# Patient Record
Sex: MALE | Race: WHITE | NOT HISPANIC OR LATINO | Employment: FULL TIME | URBAN - METROPOLITAN AREA
[De-identification: names, ages, dates, MRNs, and addresses within clinical notes are randomized per-mention and may not be internally consistent; named-entity substitution may affect disease eponyms.]

---

## 2020-01-14 ENCOUNTER — OFFICE VISIT (OUTPATIENT)
Dept: FAMILY MEDICINE CLINIC | Facility: CLINIC | Age: 35
End: 2020-01-14
Payer: COMMERCIAL

## 2020-01-14 VITALS
HEART RATE: 96 BPM | TEMPERATURE: 98.2 F | HEIGHT: 68 IN | BODY MASS INDEX: 34.56 KG/M2 | SYSTOLIC BLOOD PRESSURE: 140 MMHG | WEIGHT: 228 LBS | OXYGEN SATURATION: 98 % | DIASTOLIC BLOOD PRESSURE: 90 MMHG

## 2020-01-14 DIAGNOSIS — Z13.29 SCREENING FOR THYROID DISORDER: ICD-10-CM

## 2020-01-14 DIAGNOSIS — R03.0 ELEVATED BP WITHOUT DIAGNOSIS OF HYPERTENSION: ICD-10-CM

## 2020-01-14 DIAGNOSIS — Z72.0 TOBACCO ABUSE: ICD-10-CM

## 2020-01-14 DIAGNOSIS — Z13.6 ENCOUNTER FOR LIPID SCREENING FOR CARDIOVASCULAR DISEASE: ICD-10-CM

## 2020-01-14 DIAGNOSIS — Z13.1 ENCOUNTER FOR SCREENING FOR DIABETES MELLITUS: ICD-10-CM

## 2020-01-14 DIAGNOSIS — F90.9 ATTENTION DEFICIT HYPERACTIVITY DISORDER (ADHD), UNSPECIFIED ADHD TYPE: ICD-10-CM

## 2020-01-14 DIAGNOSIS — Z13.0 SCREENING FOR IRON DEFICIENCY ANEMIA: ICD-10-CM

## 2020-01-14 DIAGNOSIS — Z00.01 ENCOUNTER FOR WELL ADULT EXAM WITH ABNORMAL FINDINGS: Primary | ICD-10-CM

## 2020-01-14 DIAGNOSIS — Z13.220 ENCOUNTER FOR LIPID SCREENING FOR CARDIOVASCULAR DISEASE: ICD-10-CM

## 2020-01-14 DIAGNOSIS — Z11.4 SCREENING FOR HIV (HUMAN IMMUNODEFICIENCY VIRUS): ICD-10-CM

## 2020-01-14 PROBLEM — F90.2 ATTENTION DEFICIT HYPERACTIVITY DISORDER (ADHD), COMBINED TYPE: Status: ACTIVE | Noted: 2020-01-14

## 2020-01-14 PROCEDURE — 99385 PREV VISIT NEW AGE 18-39: CPT | Performed by: FAMILY MEDICINE

## 2020-01-14 RX ORDER — ESCITALOPRAM OXALATE 10 MG/1
TABLET ORAL
COMMUNITY
Start: 2019-12-31

## 2020-01-14 RX ORDER — CLONAZEPAM 0.5 MG/1
TABLET ORAL
COMMUNITY
Start: 2020-01-09

## 2020-01-14 RX ORDER — NICOTINE 21 MG/24HR
1 PATCH, TRANSDERMAL 24 HOURS TRANSDERMAL EVERY 24 HOURS
Qty: 28 PATCH | Refills: 0 | Status: SHIPPED | OUTPATIENT
Start: 2020-01-14 | End: 2020-02-14 | Stop reason: SDUPTHER

## 2020-01-14 RX ORDER — DEXTROAMPHETAMINE SACCHARATE, AMPHETAMINE ASPARTATE, DEXTROAMPHETAMINE SULFATE AND AMPHETAMINE SULFATE 5; 5; 5; 5 MG/1; MG/1; MG/1; MG/1
TABLET ORAL
COMMUNITY
Start: 2019-12-09

## 2020-01-14 NOTE — ASSESSMENT & PLAN NOTE
The BMI is above average  BMI counseling and education was provided to the patient  Nutrition recommendations include reducing portion sizes, decreasing overall calorie intake, 3-5 servings of fruits/vegetables daily, reducing fast food intake, consuming healthier snacks, decreasing soda and/or juice intake, moderation in carbohydrate intake and reducing intake of saturated fat and trans fat  Exercise recommendations include moderate aerobic physical activity for 150 minutes/week, exercising 3-5 times per week and joining a gym    The plan for blood work to rule out diabetic hyperlipidemia and thyroid problem discussed with the patient

## 2020-01-14 NOTE — ASSESSMENT & PLAN NOTE
Tobacco Use/Cessation  i     I advised patient to quit, and offered support  Discussed current use pattern  Asked patient to inform me when they set a quit date     At discussed with the patient complication of for smoking increase the risk of multiple kind of cancer he is aware  We offer him script for nicotine patch patient does smoke 5 cigarettes a day plan to start him with a nicotine patch 14 mg 1 patch a day for 4 weeks then 7 mg 1 patch a day for 2 weeks proper use of medication possible side effect discussed with the patient

## 2020-01-14 NOTE — ASSESSMENT & PLAN NOTE
Advice and education were given regarding nutrition, aerobic exercises, weight bearing exercises, cardiovascular risk reduction, fall risk reduction, and age appropriate supplements  The patient was counseled regarding instructions for management, risk factor reductions, prognosis, risks and benefits of treatment options, patient and family education, and importance of compliance with treatment       Patient decline immunization for today

## 2020-01-14 NOTE — ASSESSMENT & PLAN NOTE
The chronic stable on current medication patient the used to see by psychiatric high would like to be seen different 1 will refer him to psychiatric today

## 2020-01-14 NOTE — PROGRESS NOTES
BMI Counseling: Body mass index is 34 92 kg/m²  The BMI is above normal  Nutrition recommendations include decreasing portion sizes, encouraging healthy choices of fruits and vegetables and consuming healthier snacks  Exercise recommendations include moderate physical activity 150 minutes/week  Tobacco Cessation Counseling: Tobacco cessation counseling was provided  The patient is sincerely urged to quit consumption of tobacco  He is ready to quit tobacco  Medication options discussed  Patient agreed to medication  Nicotine patch was prescribed  FAMILY PRACTICE HEALTH MAINTENANCE OFFICE VISIT  [facility] [place]    NAME: Narciso Fields  AGE: 37 y o  SEX: male  : 1985     DATE: 2020    Assessment and Plan     1  Encounter for well adult exam with abnormal findings  Assessment & Plan:  Advice and education were given regarding nutrition, aerobic exercises, weight bearing exercises, cardiovascular risk reduction, fall risk reduction, and age appropriate supplements  The patient was counseled regarding instructions for management, risk factor reductions, prognosis, risks and benefits of treatment options, patient and family education, and importance of compliance with treatment  Patient decline immunization for today      2  BMI 34 0-34 9,adult  Assessment & Plan:  The BMI is above average  BMI counseling and education was provided to the patient  Nutrition recommendations include reducing portion sizes, decreasing overall calorie intake, 3-5 servings of fruits/vegetables daily, reducing fast food intake, consuming healthier snacks, decreasing soda and/or juice intake, moderation in carbohydrate intake and reducing intake of saturated fat and trans fat  Exercise recommendations include moderate aerobic physical activity for 150 minutes/week, exercising 3-5 times per week and joining a gym    The plan for blood work to rule out diabetic hyperlipidemia and thyroid problem discussed with the patient      3  Tobacco abuse  Assessment & Plan:  Tobacco Use/Cessation  i     I advised patient to quit, and offered support  Discussed current use pattern  Asked patient to inform me when they set a quit date     At discussed with the patient complication of for smoking increase the risk of multiple kind of cancer he is aware  We offer him script for nicotine patch patient does smoke 5 cigarettes a day plan to start him with a nicotine patch 14 mg 1 patch a day for 4 weeks then 7 mg 1 patch a day for 2 weeks proper use of medication possible side effect discussed with the patient      Orders:  -     nicotine (NICODERM CQ) 14 mg/24hr TD 24 hr patch; Place 1 patch on the skin every 24 hours    4  Elevated BP without diagnosis of hypertension  Assessment & Plan:  The discussed important lose weight low-salt diet will recheck blood pressure persistent to be high will start medication      5  Attention deficit hyperactivity disorder (ADHD), unspecified ADHD type  Assessment & Plan:  The chronic stable on current medication patient the used to see by psychiatric high would like to be seen different 1 will refer him to psychiatric today    Orders:  -     Ambulatory referral to Psychiatry; Future    6  Screening for HIV (human immunodeficiency virus)  -     [facility] HIV 1/2 AG-AB combo; Future    7  Screening for thyroid disorder  -     CBC and differential; Future  -     Basic metabolic panel; Future  -     Lipid Panel with Direct LDL reflex; Future  -     TSH, 3rd generation with Free T4 reflex; Future    8  Screening for iron deficiency anemia  -     CBC and differential; Future  -     Basic metabolic panel; Future  -     Lipid Panel with Direct LDL reflex; Future  -     TSH, 3rd generation with Free T4 reflex; Future    9  Encounter for screening for diabetes mellitus  -     CBC and differential; Future  -     Basic metabolic panel;  Future  -     Lipid Panel with Direct LDL reflex; Future  -     TSH, 3rd generation with Free T4 reflex; Future    10  Encounter for lipid screening for cardiovascular disease  -     CBC and differential; Future  -     Basic metabolic panel; Future  -     Lipid Panel with Direct LDL reflex; Future  -     TSH, 3rd generation with Free T4 reflex; Future      · Patient Counseling:   · Nutrition: Stressed importance of a well balanced diet, moderation of sodium/saturated fat, caloric balance and sufficient intake of fiber  · Exercise: Stressed the importance of regular exercise with a goal of 150 minutes per week  · Dental Health: Discussed daily flossing and brushing and regular dental visits     · Immunizations reviewed: Declined recommended vaccinations  · Discussed benefits of:  Screening labs   BMI Counseling: Body mass index is 34 92 kg/m²  Discussed with patient's BMI with him         Chief Complaint     Chief Complaint   Patient presents with    Physical Exam       History of Present Illness     Patient here for establish care an new in my office patient deny any chest pain short of breath no palpitation no cough no wheezing no hematosis no headache no blurred vision no weakness or lateralized of the symptom no abdomen pain nausea vomiting or diarrhea no renal problem no rash no fever no change in the weight and no change in the mood   He does do regular diet does not do exercise regularly patient does smoke and he had been smoking for more than 15 years and he has to be smoke half pack a day the but since he has his baby which is 1 year ago he cut down to 5 cigarettes a day  Patient known to have history of ADHD follow-up by psychiatric who manages medication      Well Adult Physical   Patient here for a comprehensive physical exam       Diet and Physical Activity  Diet: Regular diet  Exercise: rarely      Depression Screen  PHQ-9 Depression Screening    PHQ-9:    Frequency of the following problems over the past two weeks: Little interest or pleasure in doing things:  0 - not at all  Feeling down, depressed, or hopeless:  0 - not at all  PHQ-2 Score:  0          General Health  Hearing: Normal:  bilateral  Vision: no vision problems  Dental: brushes teeth twice daily    The following portions of the patient's history were reviewed and updated as appropriate: allergies, current medications, past family history, past medical history, past social history, past surgical history and problem list     Review of Systems     Review of Systems   Constitutional: Negative for fatigue and fever  HENT: Negative for ear pain, sinus pressure, sinus pain and sore throat  Eyes: Negative for pain and redness  Respiratory: Negative for cough, chest tightness and shortness of breath  Cardiovascular: Negative for chest pain, palpitations and leg swelling  Gastrointestinal: Negative for abdominal pain, blood in stool, constipation, diarrhea and nausea  Endocrine: Negative for heat intolerance and polydipsia  Genitourinary: Negative for flank pain, frequency and hematuria  Musculoskeletal: Negative for back pain and joint swelling  Skin: Negative for rash  Neurological: Negative for dizziness, numbness and headaches  Hematological: Does not bruise/bleed easily  Psychiatric/Behavioral: Negative for agitation and behavioral problems  Past Medical History     Past Medical History:   Diagnosis Date    ADD (attention deficit disorder)     Anxiety     Pilonidal cyst 2002       Past Surgical History     History reviewed  No pertinent surgical history      Social History     Social History     Socioeconomic History    Marital status: Single     Spouse name: None    Number of children: None    Years of education: None    Highest education level: None   Occupational History    None   Social Needs    Financial resource strain: Not hard at all   [employer] insecurity:     Worry: Never true     Inability: Never true   [employer] Transportation needs:     Medical: No     Non-medical: No   Tobacco Use    Smoking status: Current Every Day Smoker    Smokeless tobacco: Current User   Substance and Sexual Activity    Alcohol use: Yes    Drug use: Never    Sexual activity: Yes     Partners: Female   Lifestyle    Physical activity:     Days per week: 0 days     Minutes per session: 0 min    Stress: Not at all   Relationships    Social connections:     Talks on phone: More than three times a week     Gets together: Never     Attends Rastafari service: Never     Active member of club or organization: No     Attends meetings of clubs or organizations: Never     Relationship status: Living with partner    Intimate partner violence:     Fear of current or ex partner: No     Emotionally abused: No     Physically abused: No     Forced sexual activity: No   Other Topics Concern    None   Social History Narrative    Not a fall risk    Moderate Activity Level    Changes in Sleep    Has Animals    No Firearms    Uses Safety Belt        Family History     History reviewed  No pertinent family history  Current Medications       Current Outpatient Medications:     amphetamine-dextroamphetamine (ADDERALL) 20 mg tablet, TK 1 T PO BID, Disp: , Rfl:     clonazePAM (KlonoPIN) 0 5 mg tablet, TK 1 T PO TID, Disp: , Rfl:     escitalopram (LEXAPRO) 10 mg tablet, TK 1 T PO D, Disp: , Rfl:     nicotine (NICODERM CQ) 14 mg/24hr TD 24 hr patch, Place 1 patch on the skin every 24 hours, Disp: 28 patch, Rfl: 0     Allergies     Allergies   Allergen Reactions    Penicillins Hives       Objective     /90   Pulse 96   Temp 98 2 °F (36 8 °C) (Tympanic)   Ht 5' 7 75" (1 721 m)   Wt 103 kg (228 lb)   SpO2 98%   BMI 34 92 kg/m²      Physical Exam   Constitutional: He is oriented to person, place, and time  He appears well-developed and well-nourished  HENT:   Head: Normocephalic     Right Ear: External ear normal    Left Ear: External ear normal  Eyes: Conjunctivae and EOM are normal  Right eye exhibits no discharge  Left eye exhibits no discharge  Neck: No JVD present  Cardiovascular: Normal rate, regular rhythm and normal heart sounds  Exam reveals no gallop  No murmur heard  Pulmonary/Chest: Effort normal  No respiratory distress  He has no wheezes  He has no rales  He exhibits no tenderness  Abdominal: He exhibits no mass  There is no tenderness  There is no rebound  Musculoskeletal: He exhibits no edema or tenderness  Neurological: He is alert and oriented to person, place, and time  Skin: No rash noted  No erythema  Psychiatric: He has a normal mood and affect  Clif Garcia MD  [place] [facility]  BMI Counseling: Body mass index is 34 92 kg/m²  The BMI is above normal  Nutrition recommendations include reducing portion sizes, decreasing overall calorie intake, 3-5 servings of fruits/vegetables daily and reducing fast food intake  Exercise recommendations include moderate aerobic physical activity for 150 minutes/week

## 2020-01-14 NOTE — ASSESSMENT & PLAN NOTE
The discussed important lose weight low-salt diet will recheck blood pressure persistent to be high will start medication

## 2020-01-14 NOTE — PATIENT INSTRUCTIONS

## 2020-02-14 ENCOUNTER — OFFICE VISIT (OUTPATIENT)
Dept: FAMILY MEDICINE CLINIC | Facility: CLINIC | Age: 35
End: 2020-02-14
Payer: COMMERCIAL

## 2020-02-14 VITALS
OXYGEN SATURATION: 94 % | BODY MASS INDEX: 35.16 KG/M2 | HEART RATE: 87 BPM | WEIGHT: 224 LBS | TEMPERATURE: 99.1 F | SYSTOLIC BLOOD PRESSURE: 130 MMHG | DIASTOLIC BLOOD PRESSURE: 90 MMHG | HEIGHT: 67 IN

## 2020-02-14 DIAGNOSIS — I10 ESSENTIAL HYPERTENSION: ICD-10-CM

## 2020-02-14 DIAGNOSIS — R06.2 WHEEZING: Primary | ICD-10-CM

## 2020-02-14 PROCEDURE — 3008F BODY MASS INDEX DOCD: CPT | Performed by: FAMILY MEDICINE

## 2020-02-14 PROCEDURE — 4004F PT TOBACCO SCREEN RCVD TLK: CPT | Performed by: FAMILY MEDICINE

## 2020-02-14 PROCEDURE — 3080F DIAST BP >= 90 MM HG: CPT | Performed by: FAMILY MEDICINE

## 2020-02-14 PROCEDURE — 99214 OFFICE O/P EST MOD 30 MIN: CPT | Performed by: FAMILY MEDICINE

## 2020-02-14 PROCEDURE — 3075F SYST BP GE 130 - 139MM HG: CPT | Performed by: FAMILY MEDICINE

## 2020-02-14 RX ORDER — ALBUTEROL SULFATE 90 UG/1
2 AEROSOL, METERED RESPIRATORY (INHALATION) EVERY 6 HOURS PRN
Qty: 1 INHALER | Refills: 5 | Status: SHIPPED | OUTPATIENT
Start: 2020-02-14

## 2020-02-14 RX ORDER — TADALAFIL 10 MG/1
TABLET ORAL
COMMUNITY
Start: 2020-01-17

## 2020-02-14 NOTE — PROGRESS NOTES
Subjective:   Chief Complaint   Patient presents with    Follow-up     chronic conditions        Patient ID: Radha Rodriguez is a 37 y o  male  Patient here for follow-up previously blood pressure was 140/90 and the we evaluated today blood pressure 130/90 he deny any chest pain short of breath no palpitation no dyspnea on exertion no lower extremity edema patient did lose 4 lb from previously he did not do the blood work yet patient does smoke      The following portions of the patient's history were reviewed and updated as appropriate: allergies, current medications, past family history, past medical history, past social history, past surgical history and problem list     Review of Systems   Constitutional: Negative for fatigue and fever  HENT: Negative for ear pain, sinus pressure, sinus pain and sore throat  Eyes: Negative for pain and redness  Respiratory: Negative for cough, chest tightness and shortness of breath  Cardiovascular: Negative for chest pain, palpitations and leg swelling  Gastrointestinal: Negative for abdominal pain, blood in stool, constipation, diarrhea and nausea  Genitourinary: Negative for flank pain, frequency and hematuria  Musculoskeletal: Negative for back pain and joint swelling  Skin: Negative for rash  Neurological: Negative for dizziness, numbness and headaches  Hematological: Does not bruise/bleed easily  Objective:  Vitals:    02/14/20 1049   BP: 130/90   Pulse: 87   Temp: 99 1 °F (37 3 °C)   TempSrc: Tympanic   SpO2: 94%   Weight: 102 kg (224 lb)   Height: 5' 7" (1 702 m)      Physical Exam   Constitutional: He is oriented to person, place, and time  He appears well-developed and well-nourished  HENT:   Head: Normocephalic  Right Ear: External ear normal    Left Ear: External ear normal    Eyes: Conjunctivae and EOM are normal  Right eye exhibits no discharge  Left eye exhibits no discharge  Neck: No JVD present     Cardiovascular: Normal rate, regular rhythm and normal heart sounds  Exam reveals no gallop  No murmur heard  Pulmonary/Chest: Effort normal  No respiratory distress  He has wheezes  He has no rales  He exhibits no tenderness  Wheezing in bilateral lung worse in the left side   Abdominal: He exhibits no mass  There is no tenderness  There is no rebound  Musculoskeletal: He exhibits no edema or tenderness  Neurological: He is alert and oriented to person, place, and time  Skin: No rash noted  No erythema  Assessment/Plan:    Essential hypertension  New diagnosis asymptomatic discussed the patient important lose weight low-salt diet blood work order previously was not done yet include TSH BMP and lipid    Wheezing  A new finding on exam today and patient does have history of smoking we give a Ventolin inhaler proper use discussed with the patient we order chest x-ray       Diagnoses and all orders for this visit:    Wheezing  -     albuterol (PROVENTIL HFA,VENTOLIN HFA) 90 mcg/act inhaler;  Inhale 2 puffs every 6 (six) hours as needed for wheezing or shortness of breath  -     XR chest pa & lateral; Future    Essential hypertension    Other orders  -     tadalafil (CIALIS) 10 MG tablet; TK 1 T PO QD PRN

## 2020-02-14 NOTE — ASSESSMENT & PLAN NOTE
A new finding on exam today and patient does have history of smoking we give a Ventolin inhaler proper use discussed with the patient we order chest x-ray

## 2020-02-14 NOTE — ASSESSMENT & PLAN NOTE
New diagnosis asymptomatic discussed the patient important lose weight low-salt diet blood work order previously was not done yet include TSH BMP and lipid

## 2020-08-28 ENCOUNTER — HOSPITAL ENCOUNTER (EMERGENCY)
Facility: HOSPITAL | Age: 35
Discharge: HOME | End: 2020-08-28
Attending: EMERGENCY MEDICINE
Payer: COMMERCIAL

## 2020-08-28 VITALS
WEIGHT: 225 LBS | HEART RATE: 100 BPM | OXYGEN SATURATION: 96 % | BODY MASS INDEX: 35.24 KG/M2 | TEMPERATURE: 97.6 F | DIASTOLIC BLOOD PRESSURE: 84 MMHG | RESPIRATION RATE: 18 BRPM | SYSTOLIC BLOOD PRESSURE: 143 MMHG

## 2020-08-28 DIAGNOSIS — R19.7 DIARRHEA: ICD-10-CM

## 2020-08-28 DIAGNOSIS — K62.89 RECTAL PAIN: Primary | ICD-10-CM

## 2020-08-28 PROCEDURE — 99283 EMERGENCY DEPT VISIT LOW MDM: CPT

## 2020-08-28 PROCEDURE — 99282 EMERGENCY DEPT VISIT SF MDM: CPT | Performed by: EMERGENCY MEDICINE

## 2020-08-28 RX ORDER — LIDOCAINE HYDROCHLORIDE 20 MG/ML
1 JELLY TOPICAL ONCE
Status: COMPLETED | OUTPATIENT
Start: 2020-08-28 | End: 2020-08-28

## 2020-08-28 RX ORDER — HYDROCORTISONE 25 MG/G
CREAM TOPICAL 2 TIMES DAILY
Qty: 1 TUBE | Refills: 0 | Status: SHIPPED | OUTPATIENT
Start: 2020-08-28

## 2020-08-28 RX ORDER — HYDROCORTISONE ACETATE 25 MG/1
25 SUPPOSITORY RECTAL 2 TIMES DAILY PRN
Qty: 12 SUPPOSITORY | Refills: 0 | Status: SHIPPED | OUTPATIENT
Start: 2020-08-28

## 2020-08-28 RX ADMIN — LIDOCAINE HYDROCHLORIDE 1 APPLICATION: 20 JELLY TOPICAL at 06:28

## 2020-08-28 NOTE — ED PROVIDER NOTES
History  Chief Complaint   Patient presents with    Rectal Pain     Has had diarrhea since Tuesday/Wednesday and now has rectal pain  Increases with bowel movement and sitting upright  59-year-old male presents for rectal pain  Patient states he has been having diarrhea since earlier this week, diarrhea is nonbloody and brown  Patient reports starting around yesterday he began to experience rectal pain while having a bowel movement and also while not  Patient finds sitting upright difficult  Patient does admit to straining while having bowel movements, he does not feel as if he has hemorrhoid  Patient denies blood in his underwear or on the toilet paper  The patient has been taking Imodium and Pepto-Bismol with only mild relief  Patient has not recently been on antibiotics, denies recent travel or new foods to him  Patient reports a history of this several years ago and notes a suppository helped  Patient reports history of a GI disturbance in his mother, he has never been evaluated for this, denies family history of colon cancer  Patient denies alcohol use, admits to vaping  He denies fevers or chills, abdominal pain  Patient had 1 episode of vomiting yesterday  He has been able to tolerate p o  and has had an adequate appetite  Prior to Admission Medications   Prescriptions Last Dose Informant Patient Reported? Taking?    albuterol (PROVENTIL HFA,VENTOLIN HFA) 90 mcg/act inhaler   No No   Sig: Inhale 2 puffs every 6 (six) hours as needed for wheezing or shortness of breath   amphetamine-dextroamphetamine (ADDERALL) 20 mg tablet  Self Yes No   Sig: TK 1 T PO BID   clonazePAM (KlonoPIN) 0 5 mg tablet  Self Yes No   Sig: TK 1 T PO TID   escitalopram (LEXAPRO) 10 mg tablet  Self Yes No   Sig: TK 1 T PO D   tadalafil (CIALIS) 10 MG tablet  Self Yes No   Sig: TK 1 T PO QD PRN      Facility-Administered Medications: None       Past Medical History:   Diagnosis Date    ADD (attention deficit disorder)     Anxiety     Essential hypertension 2/14/2020    Pilonidal cyst 2002       History reviewed  No pertinent surgical history  History reviewed  No pertinent family history  I have reviewed and agree with the history as documented  E-Cigarette/Vaping    E-Cigarette Use Current Every Day User      E-Cigarette/Vaping Substances    Nicotine Yes      Social History     Tobacco Use    Smoking status: Former Smoker    Smokeless tobacco: Current User   Substance Use Topics    Alcohol use: Yes    Drug use: Never        Review of Systems   Constitutional: Negative for activity change, appetite change, chills and fever  Respiratory: Negative for shortness of breath  Cardiovascular: Negative for chest pain  Gastrointestinal: Positive for diarrhea  Negative for abdominal pain, blood in stool, constipation, nausea and vomiting  Genitourinary: Negative for dysuria  Neurological: Negative for syncope, light-headedness and headaches  All other systems reviewed and are negative  Physical Exam  ED Triage Vitals   Temperature Pulse Respirations Blood Pressure SpO2   08/28/20 0555 08/28/20 0550 08/28/20 0550 08/28/20 0550 08/28/20 0550   97 6 °F (36 4 °C) 100 18 143/84 96 %      Temp Source Heart Rate Source Patient Position - Orthostatic VS BP Location FiO2 (%)   08/28/20 0555 -- -- -- --   Oral          Pain Score       08/28/20 0550       7             Orthostatic Vital Signs  Vitals:    08/28/20 0550   BP: 143/84   Pulse: 100       Physical Exam  Vitals signs reviewed  Exam conducted with a chaperone present  Constitutional:       General: He is not in acute distress  Appearance: Normal appearance  He is obese  He is not ill-appearing, toxic-appearing or diaphoretic  HENT:      Head: Normocephalic and atraumatic  Eyes:      General:         Right eye: No discharge  Left eye: No discharge  Cardiovascular:      Rate and Rhythm: Normal rate and regular rhythm  Pulmonary:      Effort: Pulmonary effort is normal  No respiratory distress  Breath sounds: Normal breath sounds  Abdominal:      General: There is no distension  Palpations: Abdomen is soft  Tenderness: There is no abdominal tenderness  There is no guarding or rebound  Genitourinary:     Rectum: Tenderness present  No anal fissure or external hemorrhoid  Comments: Pain when spreading buttocks, no erythema or skin color changes, no fissures/fistulas, no hemorrhoids  Musculoskeletal:         General: No deformity or signs of injury  Skin:     General: Skin is warm  Coloration: Skin is not pale  Findings: No erythema  Neurological:      General: No focal deficit present  Mental Status: He is alert  Coordination: Coordination normal          ED Medications  Medications   lidocaine (URO-JET, GLYDO) 2 % urethral/mucosal gel 1 application (1 application Topical Given 8/28/20 1091)       Diagnostic Studies  Results Reviewed     None                 No orders to display         Procedures  Procedures      ED Course                                           MDM  Number of Diagnoses or Management Options  Diarrhea:   Rectal pain:   Diagnosis management comments: 44yo male presents for rectal pain  He has been experiencing diarrhea for the last several days  On exam, patient does not have any skin discolorations, hemorrhoids, evidence of fissures or fistulas  Sakina Cheadle was applied with mild relief in symptoms  Pain likely irritation from diarrhea, he was given prescription for anusol cream and suppositories as this previously helped him  He is advised to follow-up with is PCP for his symptoms today and to possibly arrange for colonoscopy           Disposition  Final diagnoses:   Rectal pain   Diarrhea     Time reflects when diagnosis was documented in both MDM as applicable and the Disposition within this note     Time User Action Codes Description Comment    8/28/2020  7:15 AM Drew Malik Add [Q72 31] Rectal pain     8/28/2020  7:15 AM Drew Malik Add [R19 7] Diarrhea       ED Disposition     ED Disposition Condition Date/Time Comment    Discharge Stable Fri Aug 28, 2020  7:15 AM Alexandra Chu discharge to home/self care  Follow-up Information     Follow up With Specialties Details Why Contact Info Additional Information    Priscilla Bullard MD [facility] Medicine Schedule an appointment as soon as possible for a visit  Follow-up with your PCP regarding your diarrhea and rectal pain  [address] [facility] Emergency Department Emergency Medicine  If symptoms worsen [address] [id number]  [phone]  ED, [address] [place] [address], [address] [phone]          Discharge Medication List as of 8/28/2020  7:16 AM      START taking these medications    Details   hydrocortisone (ANUSOL-HC) 2 5 % rectal cream Apply topically 2 (two) times a day, Starting Fri 8/28/2020, Normal      hydrocortisone (ANUSOL-HC) 25 mg suppository Insert 1 suppository (25 mg total) into the rectum 2 (two) times a day as needed for hemorrhoids, Starting Fri 8/28/2020, Print         CONTINUE these medications which have NOT CHANGED    Details   albuterol (PROVENTIL HFA,VENTOLIN HFA) 90 mcg/act inhaler Inhale 2 puffs every 6 (six) hours as needed for wheezing or shortness of breath, Starting Fri 2/14/2020, Normal      amphetamine-dextroamphetamine (ADDERALL) 20 mg tablet TK 1 T PO BID, Historical Med      clonazePAM (KlonoPIN) 0 5 mg tablet TK 1 T PO TID, Historical Med      escitalopram (LEXAPRO) 10 mg tablet TK 1 T PO D, Historical Med      tadalafil (CIALIS) 10 MG tablet TK 1 T PO QD PRN, Historical Med           No discharge procedures on file  PDMP Review     None           ED Provider  Attending physically available and evaluated Lorenzaverna Kimberley HANSEN managed the patient along with the ED Attending      Electronically Signed by         Teresia Merlin, DO  08/28/20 5443

## 2020-08-31 NOTE — ED ATTENDING ATTESTATION
8/28/2020  ITimo See, DO, saw and evaluated the patient  I have discussed the patient with the resident/non-physician practitioner and agree with the resident's/non-physician practitioner's findings, Plan of Care, and MDM as documented in the resident's/non-physician practitioner's note, except where noted  All available labs and Radiology studies were reviewed  I was present for key portions of any procedure(s) performed by the resident/non-physician practitioner and I was immediately available to provide assistance  At this point I agree with the current assessment done in the Emergency Department  I have conducted an independent evaluation of this patient a history and physical is as follows:    20-year-old male presents for rectal pain  Burning sensation  Multiple episodes of diarrhea  Hurts to wipe  No blood  Similar episode years ago that resolved on its own  Denies anal receptive sex  Denies fevers or chills  Denies any other associated symptoms  Reports last time Anusol helped    Will prescribe Anusol discharge follow-up with Colorectal needs colonoscopy likely    ED Course         Critical Care Time  Procedures

## 2021-04-26 ENCOUNTER — TELEPHONE (OUTPATIENT)
Dept: FAMILY MEDICINE CLINIC | Facility: CLINIC | Age: 36
End: 2021-04-26

## 2021-05-04 ENCOUNTER — OFFICE VISIT (OUTPATIENT)
Dept: URGENT CARE | Age: 36
End: 2021-05-04
Payer: COMMERCIAL

## 2021-05-04 ENCOUNTER — APPOINTMENT (OUTPATIENT)
Dept: RADIOLOGY | Age: 36
End: 2021-05-04
Payer: COMMERCIAL

## 2021-05-04 VITALS
DIASTOLIC BLOOD PRESSURE: 67 MMHG | TEMPERATURE: 98 F | SYSTOLIC BLOOD PRESSURE: 145 MMHG | OXYGEN SATURATION: 95 % | RESPIRATION RATE: 18 BRPM | HEART RATE: 90 BPM

## 2021-05-04 DIAGNOSIS — S99.922A INJURY OF LEFT FOOT, INITIAL ENCOUNTER: Primary | ICD-10-CM

## 2021-05-04 DIAGNOSIS — S99.922A INJURY OF LEFT FOOT, INITIAL ENCOUNTER: ICD-10-CM

## 2021-05-04 PROCEDURE — 99213 OFFICE O/P EST LOW 20 MIN: CPT | Performed by: NURSE PRACTITIONER

## 2021-05-04 PROCEDURE — 73610 X-RAY EXAM OF ANKLE: CPT

## 2021-05-04 PROCEDURE — 73630 X-RAY EXAM OF FOOT: CPT

## 2021-05-04 RX ORDER — KETOROLAC TROMETHAMINE 30 MG/ML
30 INJECTION, SOLUTION INTRAMUSCULAR; INTRAVENOUS ONCE
Status: COMPLETED | OUTPATIENT
Start: 2021-05-04 | End: 2021-05-04

## 2021-05-04 RX ADMIN — KETOROLAC TROMETHAMINE 30 MG: 30 INJECTION, SOLUTION INTRAMUSCULAR; INTRAVENOUS at 11:53

## 2021-05-04 NOTE — PROGRESS NOTES
[facility]        NAME: Leonor Musa is a 38 y o  male  : 1985    MRN: [id number]  DATE: May 4, 2021  TIME: 11:49 AM    Assessment and Plan   Injury of left foot, initial encounter [S01 489S]  1  Injury of left foot, initial encounter  XR foot 3+ vw right    XR ankle 3+ vw right    ketorolac (TORADOL) injection 30 mg         Patient Instructions     Toradol for pain at the clinic  OTC ibuprofen 600 mg TID prn with food  Make appt with ortho  Establish with PCP  Cont with crutches   Proceed to  ER if symptoms worsen  Chief Complaint     Chief Complaint   Patient presents with    Foot Pain     C/O FALLING X FRIDAY NIGHT, WITH USE OF CRUTCHES, ICE AND USE OF MOTRIN , 15 PER PT  History of Present Illness       HPI   Reports he was playing with his child 3-4 days ago, and started feeling pain then; Left foot  Does not remember any specific injury  The pain is severe, worse with weight bearing  Currently using crutches he brought from home  Says he has been taking lots of ibuprofen, with not much relief  Says he cant bear weight at all on the foot  Denies Hx of gout    Review of Systems   Review of Systems   Musculoskeletal: Positive for gait problem (unable to bear weight) and joint swelling (joint of the L big toe)  Negative for back pain and myalgias  Skin: Negative for rash and wound  Neurological: Negative for numbness           Current Medications       Current Outpatient Medications:     escitalopram (LEXAPRO) 10 mg tablet, TK 1 T PO D, Disp: , Rfl:     albuterol (PROVENTIL HFA,VENTOLIN HFA) 90 mcg/act inhaler, Inhale 2 puffs every 6 (six) hours as needed for wheezing or shortness of breath, Disp: 1 Inhaler, Rfl: 5    amphetamine-dextroamphetamine (ADDERALL) 20 mg tablet, TK 1 T PO BID, Disp: , Rfl:     clonazePAM (KlonoPIN) 0 5 mg tablet, TK 1 T PO TID, Disp: , Rfl:     hydrocortisone (ANUSOL-HC) 2 5 % rectal cream, Apply topically 2 (two) times a day, Disp: 1 Tube, Rfl: 0    hydrocortisone (ANUSOL-HC) 25 mg suppository, Insert 1 suppository (25 mg total) into the rectum 2 (two) times a day as needed for hemorrhoids, Disp: 12 suppository, Rfl: 0    tadalafil (CIALIS) 10 MG tablet, TK 1 T PO QD PRN, Disp: , Rfl:     Current Facility-Administered Medications:     ketorolac (TORADOL) injection 30 mg, 30 mg, Intramuscular, Once, ARIELLE Land    Current Allergies     Allergies as of 05/04/2021 - Reviewed 05/04/2021   Allergen Reaction Noted    Penicillins Hives 01/14/2020            The following portions of the patient's history were reviewed and updated as appropriate: allergies, current medications, past family history, past medical history, past social history, past surgical history and problem list      Past Medical History:   Diagnosis Date    ADD (attention deficit disorder)     Anxiety     Essential hypertension 2/14/2020    Pilonidal cyst 2002       History reviewed  No pertinent surgical history  No family history on file  Medications have been verified  Objective   /67   Pulse 90   Temp 98 °F (36 7 °C)   Resp 18   SpO2 95%   No LMP for male patient  Physical Exam     Physical Exam  Musculoskeletal:         General: Swelling (the left foot, with greatest swelling along the L great toe  Also minimal to mild erythema of the 1st MTP joint ) and tenderness (TTP of the lateral aspect of the L great toe, and highest pain noted on the L great toe area) present  Skin:     Capillary Refill: Capillary refill takes less than 2 seconds  Findings: No bruising

## 2021-05-04 NOTE — PATIENT INSTRUCTIONS
Ankle Sprain, Ambulatory Care   Blann AD & Fredy GY: Venous thromboembolism  BMJ 2006; 332(8865):215219  92[address] Alannah WILLIAM: MR imaging of ankle inversion injuries  Magn Reson Imaging Clin N Am 2008; 16(1):1-18  Beth GUTIÉRREZ & Gabrielle Skiff A: Current concepts: lateral ankle instability  Foot Ankle Int 2006; 27(10):854-866  MarvinJeff S: Diagnosis and management options of ankle sprain injury  Nurs Times 2005; 101(24):38-40  Lluvia NA & Ozzy N: Epidemiology of sprains of the lateral ankle ligament complex  Foot Ankle Clin 2006; 11(3):659-662  Geveronica RM, Anali Minor DV, et al: Athletes' ankle injuries: diagnosis and management  Am J Orthop 2005; 34(11):551-561  Gertrudis SHANE & Sussy J: Diagnosis and imaging of ankle instability  Foot Ankle Clin 2006; 11(3):475-496  Alpesh BD: Foot and ankle imaging in the athlete  Clin Podiatr Med Surg 2008; 25(2):249-262  Millie D: Acute ankle sprain: an update  Am Fam Physician 2006; 74(10):8461-9539  Blair GM, Gary HH, Kavita R, et al: Surgical versus conservative treatment for acute injuries of the lateral ligament complex of the ankle in adults  [place] Database Syst Rev 2007; 2007(2):1   Mathew DELEON: Assessment of acute foot and ankle sprains  Emerg Nurse 2006; 14(4):24-33  Sebastian PO & Ivan CG: Interventions for the prevention of first time and recurrent ankle sprains  Clin Sports Med 2008; 27(3):371-382  Randall P & Blair G: Ankle sprain  Clin Evid 2006; (15):0532-9798  Margarita VF: Acute pulmonary embolism  N Engl J Med 2008; 358(10):9664-0438  Juan AG, Elayne SM, Glenna AP, et al: Comparison of conventional treatment and supervised rehabilitation for treatment of acute lateral ankle sprains: a systematic review of the literature  J Orthop Sports Phys Ther 2005; 35(2):  Jeremiah CC, Cheli MW, [facility], et al: Clinical examination of the foot and ankle  [facility] 2005; 32(1):105-132    © 2014 [facility]  Information is for End User's use only and may not be sold, redistributed or otherwise used for commercial purposes  All illustrations and images included in CareNotes® are the copyrighted property of A D A M , Inc  or Orlando Cobian  The above information is an  only  It is not intended as medical advice for individual conditions or treatments  Talk to your doctor, nurse or pharmacist before following any medical regimen to see if it is safe and effective for you

## 2021-05-06 ENCOUNTER — OFFICE VISIT (OUTPATIENT)
Dept: OBGYN CLINIC | Facility: HOSPITAL | Age: 36
End: 2021-05-06
Payer: COMMERCIAL

## 2021-05-06 VITALS
BODY MASS INDEX: 37.35 KG/M2 | WEIGHT: 238 LBS | SYSTOLIC BLOOD PRESSURE: 152 MMHG | HEIGHT: 67 IN | DIASTOLIC BLOOD PRESSURE: 95 MMHG | HEART RATE: 87 BPM

## 2021-05-06 DIAGNOSIS — M10.471 ACUTE GOUT DUE TO OTHER SECONDARY CAUSE INVOLVING TOE OF RIGHT FOOT: Primary | ICD-10-CM

## 2021-05-06 DIAGNOSIS — S99.922A INJURY OF LEFT FOOT, INITIAL ENCOUNTER: ICD-10-CM

## 2021-05-06 PROCEDURE — 1036F TOBACCO NON-USER: CPT | Performed by: ORTHOPAEDIC SURGERY

## 2021-05-06 PROCEDURE — 3008F BODY MASS INDEX DOCD: CPT | Performed by: ORTHOPAEDIC SURGERY

## 2021-05-06 PROCEDURE — 99203 OFFICE O/P NEW LOW 30 MIN: CPT | Performed by: ORTHOPAEDIC SURGERY

## 2021-05-06 RX ORDER — INDOMETHACIN 25 MG/1
25 CAPSULE ORAL
Qty: 30 CAPSULE | Refills: 0 | Status: SHIPPED | OUTPATIENT
Start: 2021-05-06 | End: 2021-05-13

## 2021-05-06 RX ORDER — INDOMETHACIN 50 MG/1
50 CAPSULE ORAL
Status: CANCELLED | OUTPATIENT
Start: 2021-05-06

## 2021-05-06 NOTE — PROGRESS NOTES
Assessment:   Diagnosis ICD-10-CM Associated Orders   1  Acute gout due to other secondary cause involving toe of right foot  M10 [address]     indomethacin (INDOCIN) 25 mg capsule       Plan:  Recently taken diagnostics reviewed and physical exam performed  Diagnosis, treatment options and associated risks were discussed with the patient including no treatment, nonsurgical treatment and potential for surgical intervention  The patient was given the opportunity to ask questions regarding each  No fracture or dislocation but appears to be an acute onset of gout at his right 1st MTP joint  Will prescribe indomethacin 25 mg 1 pill t i d   Will also be placed into a high tide Cam walker boot  Weight-bearing as tolerated  To do next visit:  Return in about 1 week (around 5/13/2021) for re-check  The above stated was discussed in layman's terms and the patient expressed understanding  All questions were answered to the patient's satisfaction  Scribe Attestation    I,:  Ruddy Grey am acting as a scribe while in the presence of the attending physician :       I,:  Fabiola Sutherland MD personally performed the services described in this documentation    as scribed in my presence :             Subjective:   Abilio Martinez is a 38 y o  male who presents today for an acute onset of pain at his right foot and 1st toe  Patient started with symptoms last week which escalated over the weekend  He went to a [address] [facility] earlier in the week where x-rays were taken, Toradol injection was administered, and discharged  He presents today with swelling throughout the dorsum of his foot into his right great toe  He has difficulty with ambulation and weight-bearing  His symptoms do wake him up in the middle night        Review of systems negative unless otherwise specified in HPI  Review of Systems    Past Medical History:   Diagnosis Date    ADD (attention deficit disorder)     Anxiety     Essential hypertension 2/14/2020    Pilonidal cyst 2002       History reviewed  No pertinent surgical history  History reviewed  No pertinent family history  Social History     Occupational History    Not on file   Tobacco Use    Smoking status: Former Smoker    Smokeless tobacco: Current User   Substance and Sexual Activity    Alcohol use: Yes    Drug use: Never    Sexual activity: Yes     Partners: Female         Current Outpatient Medications:     albuterol (PROVENTIL HFA,VENTOLIN HFA) 90 mcg/act inhaler, Inhale 2 puffs every 6 (six) hours as needed for wheezing or shortness of breath, Disp: 1 Inhaler, Rfl: 5    amphetamine-dextroamphetamine (ADDERALL) 20 mg tablet, TK 1 T PO BID, Disp: , Rfl:     clonazePAM (KlonoPIN) 0 5 mg tablet, TK 1 T PO TID, Disp: , Rfl:     escitalopram (LEXAPRO) 10 mg tablet, TK 1 T PO D, Disp: , Rfl:     hydrocortisone (ANUSOL-HC) 2 5 % rectal cream, Apply topically 2 (two) times a day, Disp: 1 Tube, Rfl: 0    hydrocortisone (ANUSOL-HC) 25 mg suppository, Insert 1 suppository (25 mg total) into the rectum 2 (two) times a day as needed for hemorrhoids, Disp: 12 suppository, Rfl: 0    tadalafil (CIALIS) 10 MG tablet, TK 1 T PO QD PRN, Disp: , Rfl:     Allergies   Allergen Reactions    Penicillins Hives            Vitals:    05/06/21 1519   BP: 152/95   Pulse: 87       Objective:                    Right Ankle Exam   Swelling: moderate    Other   Sensation: normal     Comments: Ankle DF to neutral, position of comfort in 60 degrees of PF  Calf is soft and non-tender  Moderate diffuse swelling throughout the dorsum of his foot and exquisitely tender 1st MTP joint  Mild warmth  Erythema at his 1st MTP joint  Diagnostics, reviewed and taken today if performed as documented:    None performed but reviewed:      The attending physician has personally reviewed the pertinent films in PACS and interpretation is as follows:    Right foot and ankle x-rays taken and reviewed in the office today and show: no fracture or dislocation, otherwise unremarkable  Procedures, if performed today:    Procedures    None performed      Portions of the record may have been created with voice recognition software  Occasional wrong word or "sound a like" substitutions may have occurred due to the inherent limitations of voice recognition software  Read the chart carefully and recognize, using context, where substitutions have occurred

## 2021-05-07 ENCOUNTER — TELEPHONE (OUTPATIENT)
Dept: OBGYN CLINIC | Facility: HOSPITAL | Age: 36
End: 2021-05-07

## 2021-05-07 NOTE — TELEPHONE ENCOUNTER
Patient given above information and states he has not had anything to drink in 4 days but will be drinking tonight due to the pain

## 2021-05-07 NOTE — TELEPHONE ENCOUNTER
Please review this with 1 of the phone room nurses     patient was seen yesterday     it can take 48 hours for indomethacin or like product to break acute gouty attacks  I agree with the urgent cares assessment that he needs to establish with his PCP, lab work, and assume treatment both preventive as well as for acute attacks  this would ultimately be the best plan, to afford the patient 1 gate- keeper, for his diagnosis       thank you,  SUNDAR

## 2021-05-07 NOTE — TELEPHONE ENCOUNTER
Patient is calling back asking for further information about creams etc that he could put on his foot to help  Patient is asking that our nurse call him back & if possible, today     [phone]

## 2021-05-07 NOTE — TELEPHONE ENCOUNTER
Patient sees Dr Jaky Montemayor    Patient called stating indomethacin 25 mg isn't helping him anymore than ibuprofen was  Patient still remains awake at night and would like to know what more he can do about the pain      Call back # [phone]

## 2021-05-07 NOTE — TELEPHONE ENCOUNTER
Patient states that he tried 50 mg each dose last night without relief  He has been in pain for a week and he cannot get relief from the pain especially at night  He is wondering if a short-term pain medication can be called to the pharmacy until indomethacin kicks in? Andrea Valenzuela

## 2021-05-13 DIAGNOSIS — M10.471 ACUTE GOUT DUE TO OTHER SECONDARY CAUSE INVOLVING TOE OF RIGHT FOOT: ICD-10-CM

## 2021-05-13 RX ORDER — INDOMETHACIN 25 MG/1
CAPSULE ORAL
Qty: 30 CAPSULE | Refills: 0 | Status: SHIPPED | OUTPATIENT
Start: 2021-05-13

## 2021-09-03 ENCOUNTER — TELEPHONE (OUTPATIENT)
Dept: FAMILY MEDICINE CLINIC | Facility: CLINIC | Age: 36
End: 2021-09-03

## 2021-10-15 ENCOUNTER — TELEPHONE (OUTPATIENT)
Dept: FAMILY MEDICINE CLINIC | Facility: CLINIC | Age: 36
End: 2021-10-15

## 2023-07-03 ENCOUNTER — OFFICE VISIT (OUTPATIENT)
Dept: FAMILY MEDICINE CLINIC | Facility: CLINIC | Age: 38
End: 2023-07-03
Payer: COMMERCIAL

## 2023-07-03 VITALS
HEART RATE: 74 BPM | BODY MASS INDEX: 32.28 KG/M2 | SYSTOLIC BLOOD PRESSURE: 120 MMHG | OXYGEN SATURATION: 98 % | HEIGHT: 68 IN | DIASTOLIC BLOOD PRESSURE: 80 MMHG | RESPIRATION RATE: 17 BRPM | TEMPERATURE: 99.4 F | WEIGHT: 213 LBS

## 2023-07-03 DIAGNOSIS — K62.5 BRBPR (BRIGHT RED BLOOD PER RECTUM): ICD-10-CM

## 2023-07-03 DIAGNOSIS — F23 PSYCHOSIS DUE TO EMOTIONAL STRESS (HCC): ICD-10-CM

## 2023-07-03 DIAGNOSIS — F10.10 ALCOHOL ABUSE: Primary | ICD-10-CM

## 2023-07-03 DIAGNOSIS — F41.0 PANIC ATTACKS: ICD-10-CM

## 2023-07-03 PROCEDURE — 99204 OFFICE O/P NEW MOD 45 MIN: CPT | Performed by: FAMILY MEDICINE

## 2023-07-03 RX ORDER — BUSPIRONE HYDROCHLORIDE 5 MG/1
10 TABLET ORAL 2 TIMES DAILY
COMMUNITY

## 2023-07-03 RX ORDER — ARIPIPRAZOLE 5 MG/1
5 TABLET ORAL DAILY
Qty: 14 TABLET | Refills: 0 | Status: SHIPPED | OUTPATIENT
Start: 2023-07-03 | End: 2023-07-14

## 2023-07-03 RX ORDER — ALPRAZOLAM 0.5 MG/1
TABLET ORAL
COMMUNITY

## 2023-07-03 RX ORDER — GABAPENTIN 100 MG/1
100 CAPSULE ORAL 3 TIMES DAILY
COMMUNITY

## 2023-07-03 RX ORDER — ESCITALOPRAM OXALATE 20 MG/1
20 TABLET ORAL DAILY
COMMUNITY

## 2023-07-03 RX ORDER — HYDROXYZINE PAMOATE 50 MG/1
50 CAPSULE ORAL 3 TIMES DAILY PRN
COMMUNITY

## 2023-07-03 NOTE — PROGRESS NOTES
Name: Sanchez Simpson      : 1985      MRN: [id number]  Encounter Provider: Gomez Fontanez MD  Encounter Date: 7/3/2023   Encounter department: [address] [facility] patient seen today following inpatient rehab stay for alcohol abuse. Admits to a history of SA ( prescribed medications) but switched to alcohol 10 years ago. Drank off and on for 20 years but worsened the past 3 years. Was seeing a therapist and now sees a psychiatry through an intense outpatient program. Last drink was more than a month ago. Experiencing panic attacks. Passive SI but not attempts. No prior IP psych or rehab admissions. Will start Abilify 5 mg daily and increase Buspar to 10 mg BID from 5 BID. Continue Vistaril, Lexapro, and xanax as directed by psych. Explained my concerns with patient taking benzodiazepines given his history of SA and ETOH abuse. Pt understands this and the partner is managing his medications. He will also provide records from his rehab stay     1. Alcohol abuse    2. 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 Panic attacks    4. BRBPR (bright red blood per rectum)           Subjective      New patient here with partner. Last PCP was 11 years ago. History of alcohol abuse on and off for the past 20 years but has gotten worse in the last 3 years. . Prior to that he admits to SA. Recently admitted to rehab for 37 days due to alcohol abuse. This is the first rehab stay. Discharged . Sees psychiatry at the \[facility]\"" center on , , and . Has seen a therpaist but states he doesn't connect well with  Plans on seeing a new therapist through [facility] who uses EDMR ( eye movement desensitization and reprocessing) type of psychotherapy. Told to call back later this to find out when he can be seen. Has been on lexapro 20 for 10 years. Unsure if its working. Plans to come off benzo. For the past few days, he's experienced panic attacks. It improved with benzo. Says the panic attacks bring on SI and will try to find anyway to stop them. No HI, seizures, or hallucinations. Last drink was prior to rehab. He also had 2 episodes where he noticed blood in his brief. Denies constipation, diarrhea, fever, hemorrhoids, or SOB. Hasn't noticed any blood since. Review of Systems   Constitutional: Negative for fatigue and fever. Respiratory: Negative for shortness of breath. Cardiovascular: Positive for palpitations (during panic attacks ). Gastrointestinal: Positive for anal bleeding. Negative for abdominal distention, abdominal pain, constipation, diarrhea, rectal pain and vomiting. Skin: Negative. Neurological: Negative for dizziness, seizures, weakness, light-headedness and headaches. Psychiatric/Behavioral: Positive for agitation, decreased concentration, sleep disturbance and suicidal ideas. Negative for confusion and hallucinations. The patient is nervous/anxious. Current Outpatient Medications on File Prior to Visit   Medication Sig   • ALPRAZolam (XANAX) 0.5 mg tablet Take by mouth daily at bedtime as needed for anxiety   • busPIRone (BUSPAR) 5 mg tablet Take 10 mg by mouth 2 (two) times a day   • escitalopram (LEXAPRO) 20 mg tablet Take 20 mg by mouth daily   • gabapentin (NEURONTIN) 100 mg capsule Take 100 mg by mouth 3 (three) times a day   • hydrOXYzine pamoate (VISTARIL) 50 mg capsule Take 50 mg by mouth 3 (three) times a day as needed for itching       Objective     /80 (BP Location: Left arm, Patient Position: Sitting, Cuff Size: Standard)   Pulse 74   Temp 99.4 °F (37.4 °C) (Tympanic)   Resp 17   Ht 5' 8" (1.727 m)   Wt 96.6 kg (213 lb)   SpO2 98%   BMI 32.39 kg/m²     Physical Exam  Vitals reviewed. Constitutional:       General: He is in acute distress (anxious ).       Appearance: He is not toxic-appearing or diaphoretic. HENT:      Head: Normocephalic and atraumatic. Mouth/Throat:      Mouth: Mucous membranes are moist.   Eyes:      Extraocular Movements: Extraocular movements intact. Pupils: Pupils are equal, round, and reactive to light. Cardiovascular:      Rate and Rhythm: Normal rate and regular rhythm. Heart sounds: No murmur heard. Pulmonary:      Effort: Pulmonary effort is normal.      Breath sounds: Normal breath sounds. Abdominal:      General: Abdomen is flat. There is no distension. Palpations: There is no mass. Tenderness: There is no abdominal tenderness. There is no guarding or rebound. Hernia: No hernia is present. Musculoskeletal:      Right lower leg: No edema. Left lower leg: No edema. Lymphadenopathy:      Cervical: No cervical adenopathy. Skin:     General: Skin is warm. Neurological:      Mental Status: He is alert and oriented to person, place, and time. Gait: Gait normal.      Comments: Resting tremor    Psychiatric:         Attention and Perception: Attention normal.         Mood and Affect: Mood is anxious. Affect is flat. Speech: Speech normal.         Behavior: Behavior is agitated. Behavior is cooperative. Thought Content: Thought content is not delusional. Thought content does not include homicidal or suicidal ideation. Thought content does not include homicidal or suicidal plan. Cognition and Memory: Cognition normal.         Judgment: Judgment is impulsive.        Deric Tan MD

## 2023-07-14 DIAGNOSIS — F23 PSYCHOSIS DUE TO EMOTIONAL STRESS (HCC): ICD-10-CM

## 2023-07-14 RX ORDER — ARIPIPRAZOLE 5 MG/1
TABLET ORAL
Qty: 14 TABLET | Refills: 0 | Status: SHIPPED | OUTPATIENT
Start: 2023-07-14 | End: 2023-07-25

## 2023-07-25 DIAGNOSIS — F23 PSYCHOSIS DUE TO EMOTIONAL STRESS (HCC): ICD-10-CM

## 2023-07-25 RX ORDER — ARIPIPRAZOLE 5 MG/1
TABLET ORAL
Qty: 60 TABLET | Refills: 0 | Status: SHIPPED | OUTPATIENT
Start: 2023-07-25

## 2023-08-07 ENCOUNTER — HOSPITAL ENCOUNTER (EMERGENCY)
Facility: HOSPITAL | Age: 38
Discharge: HOME | End: 2023-08-07
Attending: EMERGENCY MEDICINE | Admitting: EMERGENCY MEDICINE
Payer: COMMERCIAL

## 2023-08-07 ENCOUNTER — TELEPHONE (OUTPATIENT)
Dept: PSYCHIATRY | Facility: CLINIC | Age: 38
End: 2023-08-07

## 2023-08-07 VITALS
RESPIRATION RATE: 16 BRPM | OXYGEN SATURATION: 95 % | WEIGHT: 224.87 LBS | HEART RATE: 94 BPM | SYSTOLIC BLOOD PRESSURE: 127 MMHG | DIASTOLIC BLOOD PRESSURE: 93 MMHG | TEMPERATURE: 98.1 F | BODY MASS INDEX: 34.19 KG/M2

## 2023-08-07 DIAGNOSIS — R11.2 NAUSEA AND VOMITING, UNSPECIFIED VOMITING TYPE: Primary | ICD-10-CM

## 2023-08-07 DIAGNOSIS — R42 DIZZINESS: ICD-10-CM

## 2023-08-07 LAB
ALBUMIN SERPL BCP-MCNC: 4.9 G/DL (ref 3.5–5)
ALP SERPL-CCNC: 74 U/L (ref 34–104)
ALT SERPL W P-5'-P-CCNC: 46 U/L (ref 7–52)
AMPHETAMINES SERPL QL SCN: NEGATIVE
ANION GAP SERPL CALCULATED.3IONS-SCNC: 9 MMOL/L
APAP SERPL-MCNC: <10 UG/ML (ref 10–20)
AST SERPL W P-5'-P-CCNC: 39 U/L (ref 13–39)
BARBITURATES UR QL: NEGATIVE
BASOPHILS # BLD AUTO: 0.03 THOUSANDS/ÂΜL (ref 0–0.1)
BASOPHILS NFR BLD AUTO: 0 % (ref 0–1)
BENZODIAZ UR QL: NEGATIVE
BILIRUB SERPL-MCNC: 1.02 MG/DL (ref 0.2–1)
BUN SERPL-MCNC: 13 MG/DL (ref 5–25)
CALCIUM SERPL-MCNC: 9.8 MG/DL (ref 8.4–10.2)
CHLORIDE SERPL-SCNC: 98 MMOL/L (ref 96–108)
CO2 SERPL-SCNC: 32 MMOL/L (ref 21–32)
COCAINE UR QL: NEGATIVE
CREAT SERPL-MCNC: 0.88 MG/DL (ref 0.6–1.3)
EOSINOPHIL # BLD AUTO: 0.17 THOUSAND/ÂΜL (ref 0–0.61)
EOSINOPHIL NFR BLD AUTO: 2 % (ref 0–6)
ERYTHROCYTE [DISTWIDTH] IN BLOOD BY AUTOMATED COUNT: 11.6 % (ref 11.6–15.1)
ETHANOL SERPL-MCNC: <10 MG/DL
GFR SERPL CREATININE-BSD FRML MDRD: 108 ML/MIN/1.73SQ M
GLUCOSE SERPL-MCNC: 89 MG/DL (ref 65–140)
HCT VFR BLD AUTO: 40.8 % (ref 36.5–49.3)
HGB BLD-MCNC: 14.4 G/DL (ref 12–17)
IMM GRANULOCYTES # BLD AUTO: 0.03 THOUSAND/UL (ref 0–0.2)
IMM GRANULOCYTES NFR BLD AUTO: 0 % (ref 0–2)
LYMPHOCYTES # BLD AUTO: 2.37 THOUSANDS/ÂΜL (ref 0.6–4.47)
LYMPHOCYTES NFR BLD AUTO: 22 % (ref 14–44)
MCH RBC QN AUTO: 31.2 PG (ref 26.8–34.3)
MCHC RBC AUTO-ENTMCNC: 35.3 G/DL (ref 31.4–37.4)
MCV RBC AUTO: 89 FL (ref 82–98)
METHADONE UR QL: NEGATIVE
MONOCYTES # BLD AUTO: 0.53 THOUSAND/ÂΜL (ref 0.17–1.22)
MONOCYTES NFR BLD AUTO: 5 % (ref 4–12)
NEUTROPHILS # BLD AUTO: 7.8 THOUSANDS/ÂΜL (ref 1.85–7.62)
NEUTS SEG NFR BLD AUTO: 71 % (ref 43–75)
OPIATES UR QL SCN: NEGATIVE
OXYCODONE+OXYMORPHONE UR QL SCN: NEGATIVE
PCP UR QL: NEGATIVE
PLATELET # BLD AUTO: 229 THOUSANDS/UL (ref 149–390)
PMV BLD AUTO: 9 FL (ref 8.9–12.7)
POTASSIUM SERPL-SCNC: 4.6 MMOL/L (ref 3.5–5.3)
PROT SERPL-MCNC: 7.6 G/DL (ref 6.4–8.4)
RBC # BLD AUTO: 4.61 MILLION/UL (ref 3.88–5.62)
SALICYLATES SERPL-MCNC: <5 MG/DL (ref 3–20)
SODIUM SERPL-SCNC: 139 MMOL/L (ref 135–147)
THC UR QL: NEGATIVE
WBC # BLD AUTO: 10.47 THOUSAND/UL (ref 4.31–10.16)

## 2023-08-07 PROCEDURE — 82077 ASSAY SPEC XCP UR&BREATH IA: CPT

## 2023-08-07 PROCEDURE — 80179 DRUG ASSAY SALICYLATE: CPT

## 2023-08-07 PROCEDURE — 96372 THER/PROPH/DIAG INJ SC/IM: CPT

## 2023-08-07 PROCEDURE — 80143 DRUG ASSAY ACETAMINOPHEN: CPT

## 2023-08-07 PROCEDURE — 36415 COLL VENOUS BLD VENIPUNCTURE: CPT

## 2023-08-07 PROCEDURE — 93005 ELECTROCARDIOGRAM TRACING: CPT

## 2023-08-07 PROCEDURE — 80307 DRUG TEST PRSMV CHEM ANLYZR: CPT

## 2023-08-07 PROCEDURE — 80053 COMPREHEN METABOLIC PANEL: CPT

## 2023-08-07 PROCEDURE — 99284 EMERGENCY DEPT VISIT MOD MDM: CPT

## 2023-08-07 PROCEDURE — 85025 COMPLETE CBC W/AUTO DIFF WBC: CPT

## 2023-08-07 RX ORDER — MECLIZINE HCL 12.5 MG/1
25 TABLET ORAL ONCE
Status: COMPLETED | OUTPATIENT
Start: 2023-08-07 | End: 2023-08-07

## 2023-08-07 RX ORDER — ONDANSETRON 4 MG/1
4 TABLET, ORALLY DISINTEGRATING ORAL EVERY 6 HOURS PRN
Qty: 20 TABLET | Refills: 0 | Status: SHIPPED | OUTPATIENT
Start: 2023-08-08 | End: 2023-08-13

## 2023-08-07 RX ORDER — ONDANSETRON 4 MG/1
4 TABLET, ORALLY DISINTEGRATING ORAL EVERY 6 HOURS PRN
Qty: 20 TABLET | Refills: 0 | Status: SHIPPED | OUTPATIENT
Start: 2023-08-07 | End: 2023-08-07 | Stop reason: SDUPTHER

## 2023-08-07 RX ADMIN — MECLIZINE 25 MG: 12.5 TABLET ORAL at 21:07

## 2023-08-07 RX ADMIN — TRIMETHOBENZAMIDE HYDROCHLORIDE 200 MG: 100 INJECTION INTRAMUSCULAR at 21:59

## 2023-08-07 NOTE — TELEPHONE ENCOUNTER
Patient has been added to the Medication Management wait list without a referral.    Insurance: [employer] cross  Insurance Type:    Commercial [x]   Medicaid []   [place] (if applicable)   Medicare []  [email or web address]  Provider Preference: No Pref  Virtual: Yes [] No [x]    Writer significant other called in ( Pt gave verbal consent )and stated that the pt was at an alcohol donna and after leaving there has not been psychiatrically.

## 2023-08-08 NOTE — ED PROVIDER NOTES
History  Chief Complaint   Patient presents with   • Vomiting     Pt arrives via EMS with c/o dizziness/vomiting x1. Pt states he took 2 naltrexone instead of 1 (d/t missing dose yesterday) immediately prior to vomiting. This is 73-year-old male with past medical history of panic disorder, depression, anxiety, alcohol abuse s/p rehab and naltrexone presented to ED with sudden onset of nausea, vomiting, dizziness for the past 1 day. Today, patient took 2 pills of naltrexone instead of 1 pill because yesterday he missed 1 pill.    2 days ago, patient had 20 cans of beer (12  hours per can). All the symptoms started without any prodromal symptoms or provoking events. Patient denies any fever, chills, rigors, change in appetite, weight,  urinary or bowel habits, sick contacts, travel history, recent outside food. Prior to Admission Medications   Prescriptions Last Dose Informant Patient Reported? Taking?    ALPRAZolam (XANAX) 0.5 mg tablet   Yes No   Sig: Take by mouth daily at bedtime as needed for anxiety   ARIPiprazole (ABILIFY) 5 mg tablet   No No   Sig: TAKE 1 TABLET(5 MG) BY MOUTH DAILY   albuterol (PROVENTIL HFA,VENTOLIN HFA) 90 mcg/act inhaler   No No   Sig: Inhale 2 puffs every 6 (six) hours as needed for wheezing or shortness of breath   amphetamine-dextroamphetamine (ADDERALL) 20 mg tablet  Self Yes No   Sig: TK 1 T PO BID   busPIRone (BUSPAR) 5 mg tablet   Yes No   Sig: Take 10 mg by mouth 2 (two) times a day   clonazePAM (KlonoPIN) 0.5 mg tablet  Self Yes No   Sig: TK 1 T PO TID   escitalopram (LEXAPRO) 10 mg tablet  Self Yes No   Sig: TK 1 T PO D   escitalopram (LEXAPRO) 20 mg tablet   Yes No   Sig: Take 20 mg by mouth daily   gabapentin (NEURONTIN) 100 mg capsule   Yes No   Sig: Take 100 mg by mouth 3 (three) times a day   hydrOXYzine pamoate (VISTARIL) 50 mg capsule   Yes No   Sig: Take 50 mg by mouth 3 (three) times a day as needed for itching   hydrocortisone (ANUSOL-HC) 2.5 % rectal cream   No No   Sig: Apply topically 2 (two) times a day   hydrocortisone (ANUSOL-HC) 25 mg suppository   No No   Sig: Insert 1 suppository (25 mg total) into the rectum 2 (two) times a day as needed for hemorrhoids   indomethacin (INDOCIN) 25 mg capsule   No No   Sig: TAKE 1 CAPSULE(25 MG) BY MOUTH THREE TIMES DAILY WITH MEALS   tadalafil (CIALIS) 10 MG tablet  Self Yes No   Sig: TK 1 T PO QD PRN      Facility-Administered Medications: None       Past Medical History:   Diagnosis Date   • ADD (attention deficit disorder)    • Anxiety    • Depression    • Essential hypertension 2/14/2020   • Pilonidal cyst 2002       Past Surgical History:   Procedure Laterality Date   • CYST REMOVAL     • ROOT CANAL         History reviewed. No pertinent family history. I have reviewed and agree with the history as documented. E-Cigarette/Vaping   • E-Cigarette Use Current Every Day User      E-Cigarette/Vaping Substances   • Nicotine Yes    • Flavoring Yes      Social History     Tobacco Use   • Smoking status: Some Days     Packs/day: 0.05     Types: Cigarettes   • Smokeless tobacco: Never   Vaping Use   • Vaping Use: Every day   • Substances: Nicotine, Flavoring   Substance Use Topics   • Alcohol use: Not Currently   • Drug use: Not Currently        Review of Systems   Constitutional: Negative for activity change, appetite change, chills, diaphoresis, fatigue, fever and unexpected weight change. HENT: Negative for congestion and dental problem. Eyes: Negative for discharge and itching. Respiratory: Negative for apnea, cough, choking, chest tightness, wheezing and stridor. Cardiovascular: Negative for chest pain, palpitations and leg swelling. Gastrointestinal: Positive for nausea and vomiting. Negative for abdominal distention, abdominal pain and anal bleeding. Endocrine: Negative for cold intolerance and heat intolerance. Genitourinary: Negative for difficulty urinating and dysuria.    Musculoskeletal: Negative for arthralgias and back pain. Skin: Negative for color change and pallor. Neurological: Positive for dizziness and headaches. Psychiatric/Behavioral: Negative for agitation and behavioral problems. Physical Exam  ED Triage Vitals   Temperature Pulse Respirations Blood Pressure SpO2   08/07/23 1957 08/07/23 1957 08/07/23 1957 08/07/23 1957 08/07/23 1957   98.1 °F (36.7 °C) 74 16 147/91 95 %      Temp Source Heart Rate Source Patient Position - Orthostatic VS BP Location FiO2 (%)   08/07/23 1957 08/07/23 1957 08/07/23 2118 08/07/23 1957 --   Oral Monitor Lying - Orthostatic VS Right arm       Pain Score       --                    Orthostatic Vital Signs  Vitals:    08/07/23 2118 08/07/23 2119 08/07/23 2120 08/07/23 2121   BP: 146/92 136/93 137/96 127/93   Pulse: 74 83 85 94   Patient Position - Orthostatic VS: Lying - Orthostatic VS Sitting - Orthostatic VS Standing - Orthostatic VS Standing for 3 minutes - Orthostatic VS       Physical Exam  Constitutional:       Appearance: Normal appearance. HENT:      Head: Normocephalic and atraumatic. Nose: Nose normal.      Mouth/Throat:      Mouth: Mucous membranes are moist.   Eyes:      General: No scleral icterus. Right eye: No discharge. Left eye: No discharge. Extraocular Movements: Extraocular movements intact. Conjunctiva/sclera: Conjunctivae normal.      Comments: Pupils mildly reactive to bright light possibly due to today's to 2 pills of naltrexone intake   Cardiovascular:      Rate and Rhythm: Normal rate and regular rhythm. Pulses: Normal pulses. Heart sounds: Normal heart sounds. No murmur heard. No friction rub. No gallop. Pulmonary:      Effort: Pulmonary effort is normal. No respiratory distress. Breath sounds: Normal breath sounds. No stridor. No wheezing, rhonchi or rales. Chest:      Chest wall: No tenderness. Abdominal:      General: There is no distension.       Palpations: There is no mass. Tenderness: There is no abdominal tenderness. There is no guarding or rebound. Hernia: No hernia is present. Skin:     General: Skin is warm. Capillary Refill: Capillary refill takes less than 2 seconds. Neurological:      General: No focal deficit present. Mental Status: He is alert and oriented to person, place, and time.    Psychiatric:         Mood and Affect: Mood normal.         Behavior: Behavior normal.         ED Medications  Medications   meclizine (ANTIVERT) tablet 25 mg (25 mg Oral Given 8/7/23 2107)   trimethobenzamide (TIGAN) IM injection 200 mg (200 mg Intramuscular Given 8/7/23 2159)       Diagnostic Studies  Results Reviewed     Procedure Component Value Units Date/Time    Comprehensive metabolic panel [[id number]]  (Abnormal) Collected: 08/07/23 2113    Lab Status: Final result Specimen: Blood from Arm, Left Updated: 08/07/23 2228     Sodium 139 mmol/L      Potassium 4.6 mmol/L      Chloride 98 mmol/L      CO2 32 mmol/L      ANION GAP 9 mmol/L      BUN 13 mg/dL      Creatinine 0.88 mg/dL      Glucose 89 mg/dL      Calcium 9.8 mg/dL      AST 39 U/L      ALT 46 U/L      Alkaline Phosphatase 74 U/L      Total Protein 7.6 g/dL      Albumin 4.9 g/dL      Total Bilirubin 1.02 mg/dL      eGFR 108 ml/min/1.73sq m     Narrative:      [place] guidelines for Chronic Kidney Disease (CKD):   •  Stage 1 with normal or high GFR (GFR > 90 mL/min/1.73 square meters)  •  Stage 2 Mild CKD (GFR = 60-89 mL/min/1.73 square meters)  •  Stage 3A Moderate CKD (GFR = 45-59 mL/min/1.73 square meters)  •  Stage 3B Moderate CKD (GFR = 30-44 mL/min/1.73 square meters)  •  Stage 4 Severe CKD (GFR = 15-29 mL/min/1.73 square meters)  •  Stage 5 End Stage CKD (GFR <15 mL/min/1.73 square meters)  Note: GFR calculation is accurate only with a steady state creatinine    Acetaminophen level-If concentration is detectable, please discuss with medical  on call. [[id number]]  (Abnormal) Collected: 08/07/23 2113    Lab Status: Final result Specimen: Blood from Arm, Left Updated: 08/07/23 2228     Acetaminophen Level <52 ug/mL     Salicylate level [[id number]]  (Normal) Collected: 08/07/23 2113    Lab Status: Final result Specimen: Blood from Arm, Left Updated: 08/41/72 5589     Salicylate Lvl <5 mg/dL     Ethanol [[id number]]  (Normal) Collected: 08/07/23 2113    Lab Status: Final result Specimen: Blood from Arm, Left Updated: 08/07/23 2224     Ethanol Lvl <10 mg/dL     Rapid drug screen, urine [[id number]]  (Normal) Collected: 08/07/23 2124    Lab Status: Final result Specimen: Urine, Clean Catch Updated: 08/07/23 2223     Amph/Meth UR Negative     Barbiturate Ur Negative     Benzodiazepine Urine Negative     Cocaine Urine Negative     Methadone Urine Negative     Opiate Urine Negative     PCP Ur Negative     THC Urine Negative     Oxycodone Urine Negative    Narrative:      FOR MEDICAL PURPOSES ONLY. IF CONFIRMATION NEEDED PLEASE CONTACT THE LAB WITHIN 5 DAYS.     Drug Screen Cutoff Levels:  AMPHETAMINE/METHAMPHETAMINES  1000 ng/mL  BARBITURATES     200 ng/mL  BENZODIAZEPINES     200 ng/mL  COCAINE      300 ng/mL  METHADONE      300 ng/mL  OPIATES      300 ng/mL  PHENCYCLIDINE     25 ng/mL  THC       50 ng/mL  OXYCODONE      100 ng/mL    CBC and differential [[id number]]  (Abnormal) Collected: 08/07/23 2113    Lab Status: Final result Specimen: Blood from Arm, Left Updated: 08/07/23 2156     WBC 10.47 Thousand/uL      RBC 4.61 Million/uL      Hemoglobin 14.4 g/dL      Hematocrit 40.8 %      MCV 89 fL      MCH 31.2 pg      MCHC 35.3 g/dL      RDW 11.6 %      MPV 9.0 fL      Platelets 062 Thousands/uL      Neutrophils Relative 71 %      Immat GRANS % 0 %      Lymphocytes Relative 22 %      Monocytes Relative 5 %      Eosinophils Relative 2 %      Basophils Relative 0 %      Neutrophils Absolute 7.80 Thousands/µL      Immature Grans Absolute 0.03 Thousand/uL Lymphocytes Absolute 2.37 Thousands/µL      Monocytes Absolute 0.53 Thousand/µL      Eosinophils Absolute 0.17 Thousand/µL      Basophils Absolute 0.03 Thousands/µL     Narrative: This is an appended report. These results have been appended to a previously verified report. No orders to display         Procedures  Procedures      ED Course                                       Medical Decision Making  Sudden onset of dizziness, not related to posture. Meclizine 25 mg once  EKG  CBC, CMP, rapid urine drug panel, coma panel    Amount and/or Complexity of Data Reviewed  Labs: ordered. Disposition  Final diagnoses:   Nausea and vomiting, unspecified vomiting type   Dizziness     Time reflects when diagnosis was documented in both MDM as applicable and the Disposition within this note     Time User Action Codes Description Comment    8/7/2023 10:33 PM Martha Steel Add [R11.2] Nausea and vomiting, unspecified vomiting type     8/7/2023 10:33 PM Martha Steel Add [R42] Dizziness       ED Disposition     ED Disposition   Discharge    Condition   Stable    Date/Time   Mon Aug 7, 2023 10:33 PM    Comment   Annamarie Arnold discharge to home/self care. Follow-up Information    None         Current Discharge Medication List      START taking these medications    Details   ondansetron (ZOFRAN-ODT) 4 mg disintegrating tablet Take 1 tablet (4 mg total) by mouth every 6 (six) hours as needed for nausea or vomiting for up to 5 days Do not start before August 8, 2023.   Qty: 20 tablet, Refills: 0    Associated Diagnoses: Nausea and vomiting, unspecified vomiting type         CONTINUE these medications which have NOT CHANGED    Details   albuterol (PROVENTIL HFA,VENTOLIN HFA) 90 mcg/act inhaler Inhale 2 puffs every 6 (six) hours as needed for wheezing or shortness of breath  Qty: 1 Inhaler, Refills: 5    Comments: Substitution to a formulary equivalent within the same pharmaceutical class is authorized. 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 escitalopram (LEXAPRO) 10 mg tablet TK 1 T PO D      !! escitalopram (LEXAPRO) 20 mg tablet Take 20 mg by mouth daily      gabapentin (NEURONTIN) 100 mg capsule Take 100 mg by mouth 3 (three) times a day      hydrocortisone (ANUSOL-HC) 2.5 % rectal cream Apply topically 2 (two) times a day  Qty: 1 Tube, Refills: 0    Associated Diagnoses: Rectal pain      hydrocortisone (ANUSOL-HC) 25 mg suppository Insert 1 suppository (25 mg total) into the rectum 2 (two) times a day as needed for hemorrhoids  Qty: 12 suppository, Refills: 0    Associated Diagnoses: Rectal pain      hydrOXYzine pamoate (VISTARIL) 50 mg capsule Take 50 mg by mouth 3 (three) times a day as needed for itching      indomethacin (INDOCIN) 25 mg capsule TAKE 1 CAPSULE(25 MG) BY MOUTH THREE TIMES DAILY WITH MEALS  Qty: 30 capsule, Refills: 0    Associated Diagnoses: Acute gout due to other secondary cause involving toe of right foot      tadalafil (CIALIS) 10 MG tablet TK 1 T PO QD PRN       ! ! - Potential duplicate medications found. Please discuss with provider. No discharge procedures on file. PDMP Review     None           ED Provider  Attending physically available and evaluated Dennie Raymond. I managed the patient along with the ED Attending.     Electronically Signed by         Bobo Cosby MD  08/07/23 2017       Bobo Cosby MD  08/07/23 4337

## 2023-08-09 LAB
ATRIAL RATE: 89 BPM
P AXIS: 33 DEGREES
PR INTERVAL: 174 MS
QRS AXIS: 61 DEGREES
QRSD INTERVAL: 90 MS
QT INTERVAL: 386 MS
QTC INTERVAL: 469 MS
T WAVE AXIS: 0 DEGREES
VENTRICULAR RATE: 89 BPM

## 2023-08-09 PROCEDURE — 93010 ELECTROCARDIOGRAM REPORT: CPT | Performed by: INTERNAL MEDICINE

## 2023-08-18 ENCOUNTER — TELEPHONE (OUTPATIENT)
Dept: PSYCHIATRY | Facility: CLINIC | Age: 38
End: 2023-08-18

## 2024-02-05 ENCOUNTER — OFFICE VISIT (OUTPATIENT)
Dept: FAMILY MEDICINE CLINIC | Facility: CLINIC | Age: 39
End: 2024-02-05
Payer: COMMERCIAL

## 2024-02-05 VITALS
DIASTOLIC BLOOD PRESSURE: 102 MMHG | HEART RATE: 98 BPM | OXYGEN SATURATION: 97 % | WEIGHT: 264.4 LBS | HEIGHT: 68 IN | RESPIRATION RATE: 16 BRPM | BODY MASS INDEX: 40.07 KG/M2 | TEMPERATURE: 98.5 F | SYSTOLIC BLOOD PRESSURE: 132 MMHG

## 2024-02-05 DIAGNOSIS — M19.049 CMC ARTHRITIS: ICD-10-CM

## 2024-02-05 DIAGNOSIS — M79.671 FOOT PAIN, BILATERAL: ICD-10-CM

## 2024-02-05 DIAGNOSIS — M25.50 POLYARTHRALGIA: ICD-10-CM

## 2024-02-05 DIAGNOSIS — R03.0 ELEVATED BP WITHOUT DIAGNOSIS OF HYPERTENSION: Primary | ICD-10-CM

## 2024-02-05 DIAGNOSIS — M25.561 ACUTE PAIN OF RIGHT KNEE: ICD-10-CM

## 2024-02-05 DIAGNOSIS — M79.672 FOOT PAIN, BILATERAL: ICD-10-CM

## 2024-02-05 DIAGNOSIS — Z11.59 ENCOUNTER FOR HEPATITIS C SCREENING TEST FOR LOW RISK PATIENT: ICD-10-CM

## 2024-02-05 PROCEDURE — 99214 OFFICE O/P EST MOD 30 MIN: CPT | Performed by: FAMILY MEDICINE

## 2024-02-05 RX ORDER — QUETIAPINE FUMARATE 25 MG/1
25 TABLET, FILM COATED ORAL DAILY
COMMUNITY
Start: 2024-01-18

## 2024-02-05 RX ORDER — LORAZEPAM 0.5 MG/1
0.5 TABLET ORAL 2 TIMES DAILY
COMMUNITY
Start: 2024-02-01

## 2024-02-05 RX ORDER — QUETIAPINE FUMARATE 100 MG/1
100 TABLET, FILM COATED ORAL DAILY
COMMUNITY
Start: 2024-01-18

## 2024-02-05 RX ORDER — LISINOPRIL 10 MG/1
10 TABLET ORAL DAILY
Qty: 30 TABLET | Refills: 1 | Status: SHIPPED | OUTPATIENT
Start: 2024-02-05

## 2024-02-05 RX ORDER — VENLAFAXINE HYDROCHLORIDE 150 MG/1
150 CAPSULE, EXTENDED RELEASE ORAL DAILY
COMMUNITY

## 2024-02-05 NOTE — PROGRESS NOTES
Name: Keon Barroso      : 1985      MRN: [id number]  Encounter Provider: Dani Martins MD  Encounter Date: 2024   Encounter department: [facility]    Assessment & Plan   Blood pressure elevated today with elevated home readings. Asymptomatic. Start lisinopril 10 mg daily. Knee xray ordered for possible OA. Has gained weight over the past 6 months and may be causing stress to the knee. Recommend OTC analgesics and Voltaren. Weight loss encouraged. CMC joint pain likely from bryson or typing. Topical NSAIDs and thumb splints. Podiatry for dorsal foot pain. Possibly extensor tendinitis     1. Elevated BP without diagnosis of hypertension  -     lisinopril (ZESTRIL) 10 mg tablet; Take 1 tablet (10 mg total) by mouth daily  -     Comprehensive metabolic panel; Future    2. Polyarthralgia  -     Comprehensive metabolic panel; Future  -     Uric acid; Future  -     C-reactive protein; Future    3. Acute pain of right knee  -     XR knee 3 vw right non injury; Future; Expected date: 2024    4. Foot pain, bilateral  -     Ambulatory Referral to Podiatry; Future    5. Encounter for hepatitis C screening test for low risk patient  -     Hepatitis C antibody; Future    6. CMC arthritis           Subjective      Pain in the joints ( fingers, right knee, and tops of the feet)   Started month ago    and works at a desk doing a lot of typing  History of gout   Swelling at the base of the thumb   Worse at night  Gained 51 lbs in the past 6 months   Hasn't exercise and states he's always had a poor diet  Blood pressures elevated   Checks BP at home and the bottom number has been elevated  No lightheadedness, headaches, or blurry vision  More SOB with exertion  Review of Systems    Current Outpatient Medications on File Prior to Visit   Medication Sig   • albuterol (PROVENTIL HFA,VENTOLIN HFA) 90 mcg/act inhaler Inhale 2 puffs every 6 (six) hours as needed for wheezing or shortness of  "breath   • ALPRAZolam (XANAX) 0.5 mg tablet Take by mouth daily at bedtime as needed for anxiety   • LORazepam (ATIVAN) 0.5 mg tablet Take 0.5 mg by mouth 2 (two) times a day   • QUEtiapine (SEROquel) 100 mg tablet Take 100 mg by mouth daily   • QUEtiapine (SEROquel) 25 mg tablet Take 25 mg by mouth daily   • venlafaxine (EFFEXOR-XR) 150 mg 24 hr capsule Take 150 mg by mouth daily   • amphetamine-dextroamphetamine (ADDERALL) 20 mg tablet TK 1 T PO BID   • ARIPiprazole (ABILIFY) 5 mg tablet TAKE 1 TABLET(5 MG) BY MOUTH DAILY   • busPIRone (BUSPAR) 5 mg tablet Take 10 mg by mouth 2 (two) times a day   • clonazePAM (KlonoPIN) 0.5 mg tablet TK 1 T PO TID   • escitalopram (LEXAPRO) 10 mg tablet TK 1 T PO D   • escitalopram (LEXAPRO) 20 mg tablet Take 20 mg by mouth daily   • tadalafil (CIALIS) 10 MG tablet TK 1 T PO QD PRN (Patient not taking: Reported on 2/5/2024)   • [DISCONTINUED] gabapentin (NEURONTIN) 100 mg capsule Take 100 mg by mouth 3 (three) times a day   • [DISCONTINUED] hydrocortisone (ANUSOL-HC) 2.5 % rectal cream Apply topically 2 (two) times a day   • [DISCONTINUED] hydrocortisone (ANUSOL-HC) 25 mg suppository Insert 1 suppository (25 mg total) into the rectum 2 (two) times a day as needed for hemorrhoids   • [DISCONTINUED] hydrOXYzine pamoate (VISTARIL) 50 mg capsule Take 50 mg by mouth 3 (three) times a day as needed for itching   • [DISCONTINUED] indomethacin (INDOCIN) 25 mg capsule TAKE 1 CAPSULE(25 MG) BY MOUTH THREE TIMES DAILY WITH MEALS   • [DISCONTINUED] ondansetron (ZOFRAN-ODT) 4 mg disintegrating tablet Take 1 tablet (4 mg total) by mouth every 6 (six) hours as needed for nausea or vomiting for up to 5 days Do not start before August 8, 2023.       Objective     BP (!) 132/102 (BP Location: Left arm, Patient Position: Sitting, Cuff Size: Large)   Pulse 98   Temp 98.5 °F (36.9 °C) (Tympanic)   Resp 16   Ht 5' 8\" (1.727 m)   Wt 120 kg (264 lb 6.4 oz)   SpO2 97%   BMI 40.20 kg/m² "     Physical Exam  Constitutional:       General: He is not in acute distress.     Appearance: Normal appearance. He is not ill-appearing or toxic-appearing.   HENT:      Head: Normocephalic and atraumatic.   Cardiovascular:      Rate and Rhythm: Normal rate and regular rhythm.      Heart sounds: No murmur heard.  Pulmonary:      Effort: Pulmonary effort is normal. No respiratory distress.      Breath sounds: Normal breath sounds. No stridor. No wheezing, rhonchi or rales.   Musculoskeletal:      Right knee: Bony tenderness present. No swelling, deformity, effusion, erythema or crepitus. Tenderness present over the lateral joint line. No medial joint line, MCL, LCL, ACL, PCL or patellar tendon tenderness. No ACL laxity or PCL laxity. Normal patellar mobility.      Instability Tests: Anterior drawer test negative. Posterior drawer test negative.      Left knee: No tenderness.      Right foot: Normal range of motion. Tenderness (dorsal foot) and bony tenderness present. No swelling or deformity.      Left foot: Normal range of motion. Tenderness and bony tenderness present. No swelling or deformity.        Legs:    Neurological:      Mental Status: He is alert.     Dani Martins MD

## 2024-02-23 NOTE — TELEPHONE ENCOUNTER
Writer rec a call from pts mother regarding finding help for pt. Pt is currently inpatient at [facility] and want to be relocated in [place] where he lives. Writer advised mother and gave her the phone number for [facility], mailed outside [facility], and advised her about walk in in [facility] for services.  "Pt is suffering from" anxiety, depression and possiblly  Bi-polar as per mother."
Palpitations    A palpitation is the feeling that your heartbeat is irregular or is faster than normal. It may feel like your heart is fluttering or skipping a beat. They may be caused by many things, including smoking, caffeine, alcohol, stress, and certain medicines. Although most causes of palpitations are not serious, palpitations can be a sign of a serious medical problem. Avoid caffeine, alcohol, and tobacco products at home. Try to reduce stress and anxiety and make sure to get plenty of rest.     SEEK IMMEDIATE MEDICAL CARE IF YOU HAVE ANY OF THE FOLLOWING SYMPTOMS: chest pain, shortness of breath, severe headache, dizziness/lightheadedness, or fainting.

## 2024-04-08 DIAGNOSIS — R03.0 ELEVATED BP WITHOUT DIAGNOSIS OF HYPERTENSION: ICD-10-CM

## 2024-04-08 RX ORDER — LISINOPRIL 10 MG/1
10 TABLET ORAL DAILY
Qty: 30 TABLET | Refills: 1 | Status: SHIPPED | OUTPATIENT
Start: 2024-04-08

## 2024-08-14 ENCOUNTER — TELEPHONE (OUTPATIENT)
Age: 39
End: 2024-08-14

## 2024-08-14 NOTE — TELEPHONE ENCOUNTER
Contacted patient off of Medication Management  and NON-REFERRAL to verify needs of services in attempts to offer patient an appointment. LVM for patient to contact intake dept  in regards to wait list.      awaiting abd u/s to further characterize liver lesions prior to IR guided liver biopsy monday

## 2024-08-21 NOTE — TELEPHONE ENCOUNTER
Reached out to pt in regards to Medication Management wait list and to offer an appt. Pt would not confirm . Instructed pt to call back if they would like and pt hung up.    Second attempt to offer an appt. Pt removed from wait list.

## 2025-04-01 ENCOUNTER — OFFICE VISIT (OUTPATIENT)
Dept: FAMILY MEDICINE CLINIC | Facility: CLINIC | Age: 40
End: 2025-04-01
Payer: COMMERCIAL

## 2025-04-01 VITALS
SYSTOLIC BLOOD PRESSURE: 140 MMHG | TEMPERATURE: 95.2 F | HEIGHT: 68 IN | DIASTOLIC BLOOD PRESSURE: 90 MMHG | WEIGHT: 284 LBS | HEART RATE: 84 BPM | BODY MASS INDEX: 43.04 KG/M2 | OXYGEN SATURATION: 95 %

## 2025-04-01 DIAGNOSIS — F23 PSYCHOSIS DUE TO EMOTIONAL STRESS (HCC): ICD-10-CM

## 2025-04-01 DIAGNOSIS — Z13.6 SCREENING FOR CARDIOVASCULAR CONDITION: ICD-10-CM

## 2025-04-01 DIAGNOSIS — Z00.01 ENCOUNTER FOR WELL ADULT EXAM WITH ABNORMAL FINDINGS: Primary | ICD-10-CM

## 2025-04-01 DIAGNOSIS — Z13.29 SCREENING FOR THYROID DISORDER: ICD-10-CM

## 2025-04-01 DIAGNOSIS — Z23 ENCOUNTER FOR IMMUNIZATION: ICD-10-CM

## 2025-04-01 DIAGNOSIS — Z11.4 SCREENING FOR HIV (HUMAN IMMUNODEFICIENCY VIRUS): ICD-10-CM

## 2025-04-01 DIAGNOSIS — I10 ESSENTIAL HYPERTENSION: ICD-10-CM

## 2025-04-01 DIAGNOSIS — F17.290 VAPING NICOTINE DEPENDENCE, TOBACCO PRODUCT: ICD-10-CM

## 2025-04-01 DIAGNOSIS — R06.83 SNORING: ICD-10-CM

## 2025-04-01 DIAGNOSIS — Z12.5 SCREENING FOR PROSTATE CANCER: ICD-10-CM

## 2025-04-01 DIAGNOSIS — Z23 NEED FOR PNEUMOCOCCAL 20-VALENT CONJUGATE VACCINATION: ICD-10-CM

## 2025-04-01 DIAGNOSIS — Z13.0 SCREENING FOR DEFICIENCY ANEMIA: ICD-10-CM

## 2025-04-01 DIAGNOSIS — Z11.59 NEED FOR HEPATITIS C SCREENING TEST: ICD-10-CM

## 2025-04-01 DIAGNOSIS — E66.01 MORBID OBESITY WITH BODY MASS INDEX (BMI) OF 40.0 TO 44.9 IN ADULT (HCC): ICD-10-CM

## 2025-04-01 PROBLEM — Z72.0 TOBACCO ABUSE: Status: RESOLVED | Noted: 2020-01-14 | Resolved: 2025-04-01

## 2025-04-01 PROCEDURE — 99204 OFFICE O/P NEW MOD 45 MIN: CPT | Performed by: FAMILY MEDICINE

## 2025-04-01 PROCEDURE — 99396 PREV VISIT EST AGE 40-64: CPT | Performed by: FAMILY MEDICINE

## 2025-04-01 PROCEDURE — 90471 IMMUNIZATION ADMIN: CPT

## 2025-04-01 PROCEDURE — 90677 PCV20 VACCINE IM: CPT

## 2025-04-01 RX ORDER — QUETIAPINE FUMARATE 200 MG/1
1 TABLET, FILM COATED ORAL DAILY
COMMUNITY
Start: 2025-03-24

## 2025-04-01 NOTE — ASSESSMENT & PLAN NOTE
Symptomatic patient at risk for obstructive sleep apnea discussed with patient important lose weight proper sleep position

## 2025-04-01 NOTE — PROGRESS NOTES
Adult Annual Physical  Name: Keon Barroso      : 1985      MRN: [id number]  Encounter Provider: Tyler Madrigal MD  Encounter Date: 2025   Encounter department: [facility] PRIMARY CARE    Assessment & Plan  Encounter for well adult exam with abnormal findings  Advice and education were given regarding nutrition, aerobic exercises, weight-bearing exercises, cardiovascular risk reduction, fall risk reduction, and age-appropriate supplements.     The patient was counseled regarding instructions for management, risk factor reductions, prognosis, risks and benefits of treatment options, patient and family education, and importance of compliance with treatment.  Discussed with the patient screening for HIV and hepatitis C he is agree proper immunization review also due for PSA screening for prostate cancer       Vaping nicotine dependence, tobacco product  Patient used to smoke cigarette and now he is vape aware of the complication not ready to quit yet       Morbid obesity with body mass index (BMI) of 40.0 to 44.9 in adult ([facility])  Chronic uncontrolled BMI today 43.8 increased compared with before encourage patient to watch for the portion low-carb low-fat diet increase physical activity plan to screen for diabetic and hyperlipidemia    Essential hypertension  Blood pressure today uncontrolled 140/90 patient is symptomatic he had a history of hypertension but he has not been taking his medication discussed with the patient monitor blood pressure at home low-salt diet recommended important lose weight plan to do CBC CMP UA TSH       Psychosis due to emotional stress ([facility])  Follow-up with  psychiatric will manage his medication       Snoring  Symptomatic patient at risk for obstructive sleep apnea discussed with patient important lose weight proper sleep position       Screening for deficiency anemia    Orders:  •  CBC and differential; Future    Screening for cardiovascular  condition    Orders:  •  Comprehensive metabolic panel; Future  •  Lipid Panel with Direct LDL reflex; Future    Screening for thyroid disorder    Orders:  •  TSH, 3rd generation with Free T4 reflex; Future    Screening for prostate cancer    Orders:  •  PSA, Total Screen; Future    Encounter for immunization    Orders:  •  Pneumococcal Conjugate Vaccine 20-valent (Pcv20)    Need for pneumococcal 20-valent conjugate vaccination    Orders:  •  Pneumococcal Conjugate Vaccine 20-valent (Pcv20)    Screening for HIV (human immunodeficiency virus)    Orders:  •  HIV 1/2 AG/AB w Reflex [facility] for 2 yr old and above; Future    Need for hepatitis C screening test    Orders:  •  Hepatitis C antibody; Future    Preventive Screenings:  - Diabetes Screening: risks/benefits discussed  - Cholesterol Screening: risks/benefits discussed   - Hepatitis C screening: risks/benefits discussed   - HIV screening: risks/benefits discussed   - Colon cancer screening: screening not indicated   - Lung cancer screening: screening not indicated   - Prostate cancer screening: screening not indicated     Immunizations:  - Immunizations due: Prevnar 20  - Risks/benefits immunizations discussed    - Immunizations given per orders      Counseling/Anticipatory Guidance:    - Sexual health: discussed sexually transmitted diseases, partner selection, use of condoms, avoidance of unintended pregnancy, and contraceptive alternatives.   - Diet: discussed recommendations for a healthy/well-balanced diet.          History of Present Illness   Patient here establish care my office new patient history of hypertension blood pressure today uncontrolled he has not been taking medication for a long time patient gained weight compared with before and not been doing any special diet or exercise past medical surgical family and social history review patient admits he been snoring loud and he does not feel refreshed after he wake up from his sleep    Adult Annual  "Physical:  Patient presents for annual physical.     Diet and Physical Activity:  - Diet/Nutrition: no special diet.  - Exercise: 3-4 times a week on average.    Depression Screening:  - PHQ-2 Score: 0    General Health:  - Sleep: snores loudly.  - Hearing: normal hearing bilateral ears.  - Vision: no vision problems.  - Dental: brushes teeth twice daily.    Review of Systems      Objective   /90 (BP Location: Left arm, Patient Position: Sitting)   Pulse 84   Temp (!) 95.2 °F (35.1 °C) (Tympanic)   Ht 5' 7.5\" (1.715 m)   Wt 129 kg (284 lb)   SpO2 95%   BMI 43.82 kg/m²     Physical Exam    "

## 2025-04-01 NOTE — ASSESSMENT & PLAN NOTE
Chronic uncontrolled BMI today 43.8 increased compared with before encourage patient to watch for the portion low-carb low-fat diet increase physical activity plan to screen for diabetic and hyperlipidemia

## 2025-04-01 NOTE — ASSESSMENT & PLAN NOTE
Blood pressure today uncontrolled 140/90 patient is symptomatic he had a history of hypertension but he has not been taking his medication discussed with the patient monitor blood pressure at home low-salt diet recommended important lose weight plan to do CBC CMP UA TSH

## 2025-04-01 NOTE — ASSESSMENT & PLAN NOTE
Advice and education were given regarding nutrition, aerobic exercises, weight-bearing exercises, cardiovascular risk reduction, fall risk reduction, and age-appropriate supplements.     The patient was counseled regarding instructions for management, risk factor reductions, prognosis, risks and benefits of treatment options, patient and family education, and importance of compliance with treatment.  Discussed with the patient screening for HIV and hepatitis C he is agree proper immunization review also due for PSA screening for prostate cancer

## 2025-04-15 ENCOUNTER — OFFICE VISIT (OUTPATIENT)
Dept: FAMILY MEDICINE CLINIC | Facility: CLINIC | Age: 40
End: 2025-04-15
Payer: COMMERCIAL

## 2025-04-15 VITALS
OXYGEN SATURATION: 98 % | TEMPERATURE: 98.1 F | SYSTOLIC BLOOD PRESSURE: 130 MMHG | WEIGHT: 282 LBS | HEART RATE: 98 BPM | HEIGHT: 68 IN | BODY MASS INDEX: 42.74 KG/M2 | DIASTOLIC BLOOD PRESSURE: 90 MMHG

## 2025-04-15 DIAGNOSIS — E55.9 VITAMIN D DEFICIENCY: ICD-10-CM

## 2025-04-15 DIAGNOSIS — I10 ESSENTIAL HYPERTENSION: Primary | ICD-10-CM

## 2025-04-15 DIAGNOSIS — R73.01 IFG (IMPAIRED FASTING GLUCOSE): ICD-10-CM

## 2025-04-15 PROCEDURE — 99214 OFFICE O/P EST MOD 30 MIN: CPT | Performed by: FAMILY MEDICINE

## 2025-04-15 RX ORDER — AMLODIPINE BESYLATE 5 MG/1
5 TABLET ORAL DAILY
Qty: 90 TABLET | Refills: 1 | Status: SHIPPED | OUTPATIENT
Start: 2025-04-15

## 2025-04-15 RX ORDER — ERGOCALCIFEROL 1.25 MG/1
50000 CAPSULE, LIQUID FILLED ORAL WEEKLY
Qty: 4 CAPSULE | Refills: 2 | Status: SHIPPED | OUTPATIENT
Start: 2025-04-15

## 2025-04-15 NOTE — PROGRESS NOTES
Name: Keon Barroso      : 1985      MRN: [id number]  Encounter Provider: Tyler Madrigal MD  Encounter Date: 4/15/2025   Encounter department: [facility] PRIMARY CARE  :  Assessment & Plan  Essential hypertension  Chronic uncontrolled patient has been monitor blood pressure at home but diastolic numbers still going above 90 recommend to start Norvasc 5 mg once a day proper use reviewed low-salt diet important to lose weight review  Orders:  •  ergocalciferol (VITAMIN D2) 50,000 units; Take 1 capsule (50,000 Units total) by mouth once a week  •  amLODIPine (NORVASC) 5 mg tablet; Take 1 tablet (5 mg total) by mouth daily    IFG (impaired fasting glucose)  New diagnosis finding on the blood work recommend low-carb diet important lose weight       Vitamin D deficiency  New diagnosis uncontrolled recommend start vitamin D 50,000 IU once a week for 12-week proper use reviewed  Orders:  •  ergocalciferol (VITAMIN D2) 50,000 units; Take 1 capsule (50,000 Units total) by mouth once a week           History of Present Illness   Patient here follow-up with a chronic condition patient had a blood work do not have the results available in the chart the patient pulled the result on his cell phone I reviewed the record with him that showed abnormal vitamin D abnormal blood sugar.  Patient with history of hypertension has been monitoring blood pressure at home and in the decigram but has been running high      Review of Systems   Constitutional:  Negative for chills and fever.   HENT:  Negative for ear pain and sore throat.    Eyes:  Negative for pain and visual disturbance.   Respiratory:  Negative for cough and shortness of breath.    Cardiovascular:  Negative for chest pain and palpitations.   Gastrointestinal:  Negative for abdominal pain, constipation, diarrhea and vomiting.   Genitourinary:  Negative for dysuria and hematuria.   Musculoskeletal:  Negative for gait problem.   Skin:  Negative for color  "change and rash.   Neurological:  Negative for seizures and syncope.   All other systems reviewed and are negative.      Objective   /90 (BP Location: Left arm, Patient Position: Sitting)   Pulse 98   Temp 98.1 °F (36.7 °C) (Tympanic)   Ht 5' 7.5\" (1.715 m)   Wt 128 kg (282 lb)   SpO2 98%   BMI 43.52 kg/m²      Physical Exam  Vitals and nursing note reviewed.   Constitutional:       General: He is not in acute distress.     Appearance: He is well-developed. He is not diaphoretic.   HENT:      Head: Normocephalic.      Right Ear: Tympanic membrane and external ear normal.      Left Ear: Tympanic membrane and external ear normal.      Nose: No rhinorrhea.      Mouth/Throat:      Pharynx: No posterior oropharyngeal erythema.   Eyes:      General:         Right eye: No discharge.         Left eye: No discharge.      Conjunctiva/sclera: Conjunctivae normal.   Neck:      Vascular: No JVD.   Cardiovascular:      Rate and Rhythm: Normal rate and regular rhythm.      Heart sounds: Normal heart sounds. No murmur heard.     No gallop.   Pulmonary:      Effort: Pulmonary effort is normal. No respiratory distress.      Breath sounds: Normal breath sounds. No wheezing.   Abdominal:      General: There is no distension.      Palpations: Abdomen is soft.      Tenderness: There is no abdominal tenderness. There is no rebound.   Musculoskeletal:         General: No tenderness.      Cervical back: Normal range of motion and neck supple.   Lymphadenopathy:      Cervical: No cervical adenopathy.   Skin:     General: Skin is warm.      Findings: No rash.   Neurological:      Mental Status: He is alert and oriented to person, place, and time.         "

## 2025-04-15 NOTE — ASSESSMENT & PLAN NOTE
Chronic uncontrolled patient has been monitor blood pressure at home but diastolic numbers still going above 90 recommend to start Norvasc 5 mg once a day proper use reviewed low-salt diet important to lose weight review  Orders:  •  ergocalciferol (VITAMIN D2) 50,000 units; Take 1 capsule (50,000 Units total) by mouth once a week  •  amLODIPine (NORVASC) 5 mg tablet; Take 1 tablet (5 mg total) by mouth daily

## 2025-04-15 NOTE — ASSESSMENT & PLAN NOTE
New diagnosis uncontrolled recommend start vitamin D 50,000 IU once a week for 12-week proper use reviewed  Orders:  •  ergocalciferol (VITAMIN D2) 50,000 units; Take 1 capsule (50,000 Units total) by mouth once a week